# Patient Record
Sex: FEMALE | Race: BLACK OR AFRICAN AMERICAN | NOT HISPANIC OR LATINO | Employment: FULL TIME | URBAN - METROPOLITAN AREA
[De-identification: names, ages, dates, MRNs, and addresses within clinical notes are randomized per-mention and may not be internally consistent; named-entity substitution may affect disease eponyms.]

---

## 2023-05-31 ENCOUNTER — PATIENT OUTREACH (OUTPATIENT)
Dept: CARE COORDINATION | Facility: CLINIC | Age: 43
End: 2023-05-31

## 2023-06-01 ENCOUNTER — PATIENT OUTREACH (OUTPATIENT)
Dept: CARE COORDINATION | Facility: CLINIC | Age: 43
End: 2023-06-01

## 2023-06-01 SDOH — ECONOMIC STABILITY: GENERAL: WOULD YOU LIKE HELP WITH ANY OF THE FOLLOWING NEEDS?: I DONT NEED HELP WITH ANY OF THESE

## 2023-06-01 NOTE — PROGRESS NOTES
Outreach call to patient to support a smooth transition of care from recent admission.  Spoke with patient, reviewed discharge medications, discharge instructions, assessed social needs, and provided education on importance of follow-up appointment with provider. Enrolled patient in [employer] chatbot for additional support and education through transition period.  Will continue to monitor through transition period.  Engagement  Call Start Time: 0924 (6/1/2023  9:24 AM)    Medications  Medications reviewed with patient/caregiver?: Yes (6/1/2023  9:24 AM)  Is the patient having any side effects they believe may be caused by any medication additions or changes?: No (6/1/2023  9:24 AM)  Does the patient have all medications ordered at discharge?: Yes (6/1/2023  9:24 AM)  Care Management Interventions: No intervention needed (6/1/2023  9:24 AM)  Is the patient taking all medications as directed (includes completed medication regime)?: Yes (6/1/2023  9:24 AM)  Care Management Interventions: Provided patient education (6/1/2023  9:24 AM)    Appointments  Does the patient have a primary care provider?: Yes (6/1/2023  9:24 AM)  Care Management Interventions: Verified appointment date/time/provider (Patient has f/u appt 6/1/23 with Derm and 6/8/23 with PCP) (6/1/2023  9:24 AM)  Care Management Interventions: Advised patient to keep appointment (6/1/2023  9:24 AM)    Patient Teaching  Does the patient have access to their discharge instructions?: Yes (6/1/2023  9:24 AM)  Care Management Interventions: Reviewed instructions with patient (6/1/2023  9:24 AM)  What is the patient's perception of their health status since discharge?: Improving (6/1/2023  9:24 AM)    Wrap Up  Wrap Up Additional Comments: Post-discharge assessment complete with discharge instruction review (6/1/2023  9:24 AM)  Call End Time: 0927 (6/1/2023  9:24 AM)      NCM and patient discussed recent hospitalization.  Patient admitted with uncontrolled diabetes  with hyperglycemia.  Patient was discharged home with new insulin orders.  Patient new to insulin and was on oral diabetic medications at home.  [facility] provided education on diabetes action plan, how to manage hypoglycemia, and monitoring blood glucose levels.  Patient agreed to [facility] follow-up/outreach biweekly for diabetic education.      Lashawn ZAPIEN RN CCM

## 2023-10-03 DIAGNOSIS — Z87.39 PERSONAL HISTORY OF OTHER DISEASES OF THE MUSCULOSKELETAL SYSTEM AND CONNECTIVE TISSUE: ICD-10-CM

## 2023-10-03 RX ORDER — CYCLOBENZAPRINE HCL 10 MG
10 TABLET ORAL NIGHTLY
Qty: 30 TABLET | Refills: 2 | Status: SHIPPED | OUTPATIENT
Start: 2023-10-03

## 2023-10-19 PROBLEM — N93.9 ABNORMAL UTERINE BLEEDING: Status: ACTIVE | Noted: 2023-10-19

## 2023-10-19 PROBLEM — E78.5 HYPERLIPIDEMIA: Status: ACTIVE | Noted: 2023-10-19

## 2023-10-19 PROBLEM — F41.9 ANXIETY: Status: ACTIVE | Noted: 2023-10-19

## 2023-10-19 PROBLEM — H52.209 ASTIGMATISM: Status: ACTIVE | Noted: 2023-10-19

## 2023-10-19 PROBLEM — E66.812 OBESITY, CLASS II, BMI 35-39.9: Status: ACTIVE | Noted: 2023-10-19

## 2023-10-19 PROBLEM — G47.33 OSA (OBSTRUCTIVE SLEEP APNEA): Status: ACTIVE | Noted: 2023-10-19

## 2023-10-19 PROBLEM — I82.431 DEEP VEIN THROMBOSIS (DVT) OF POPLITEAL VEIN OF RIGHT LOWER EXTREMITY (MULTI): Status: ACTIVE | Noted: 2023-10-19

## 2023-10-19 PROBLEM — E11.9 DIABETES MELLITUS (MULTI): Status: ACTIVE | Noted: 2023-10-19

## 2023-10-19 PROBLEM — M17.11 LOCALIZED PRIMARY OSTEOARTHRITIS OF RIGHT LOWER LEG: Status: ACTIVE | Noted: 2023-10-19

## 2023-10-19 PROBLEM — N80.9 ENDOMETRIOSIS: Status: ACTIVE | Noted: 2023-10-19

## 2023-10-19 PROBLEM — R10.2 CHRONIC PELVIC PAIN IN FEMALE: Status: ACTIVE | Noted: 2023-10-19

## 2023-10-19 PROBLEM — M19.90 OSTEOARTHRITIS: Status: ACTIVE | Noted: 2023-10-19

## 2023-10-19 PROBLEM — K76.0 FATTY LIVER: Status: ACTIVE | Noted: 2023-10-19

## 2023-10-19 PROBLEM — R92.8 ABNORMAL FINDING ON MAMMOGRAPHY: Status: ACTIVE | Noted: 2023-10-19

## 2023-10-19 PROBLEM — I50.9 CHF (CONGESTIVE HEART FAILURE) (MULTI): Status: ACTIVE | Noted: 2023-10-19

## 2023-10-19 PROBLEM — G89.29 CHRONIC PELVIC PAIN IN FEMALE: Status: ACTIVE | Noted: 2023-10-19

## 2023-10-19 PROBLEM — N63.0 BREAST LUMP: Status: ACTIVE | Noted: 2023-10-19

## 2023-10-19 PROBLEM — G47.00 INSOMNIA: Status: ACTIVE | Noted: 2023-10-19

## 2023-10-19 PROBLEM — I10 HYPERTENSION: Status: ACTIVE | Noted: 2023-10-19

## 2023-10-19 PROBLEM — M79.645 PAIN OF LEFT THUMB: Status: ACTIVE | Noted: 2023-10-19

## 2023-10-19 PROBLEM — N19 RENAL FAILURE: Status: ACTIVE | Noted: 2023-10-19

## 2023-10-19 PROBLEM — R06.02 SOBOE (SHORTNESS OF BREATH ON EXERTION): Status: ACTIVE | Noted: 2023-10-19

## 2023-10-19 PROBLEM — E55.9 VITAMIN D DEFICIENCY: Status: ACTIVE | Noted: 2023-10-19

## 2023-10-19 PROBLEM — M06.4 INFLAMMATORY POLYARTHROPATHY (MULTI): Status: ACTIVE | Noted: 2023-10-19

## 2023-10-19 PROBLEM — E66.9 OBESITY, CLASS II, BMI 35-39.9: Status: ACTIVE | Noted: 2023-10-19

## 2023-10-19 PROBLEM — M65.312 TRIGGER THUMB OF BOTH THUMBS: Status: ACTIVE | Noted: 2023-10-19

## 2023-10-19 PROBLEM — M65.311 TRIGGER THUMB OF BOTH THUMBS: Status: ACTIVE | Noted: 2023-10-19

## 2023-10-19 PROBLEM — N63.10 BREAST MASS, RIGHT: Status: ACTIVE | Noted: 2023-10-19

## 2023-10-19 PROBLEM — R79.89 ELEVATED SERUM CREATININE: Status: ACTIVE | Noted: 2023-10-19

## 2023-10-19 PROBLEM — R20.2 PARESTHESIA OF BOTH HANDS: Status: ACTIVE | Noted: 2023-10-19

## 2023-10-19 PROBLEM — F41.8 DEPRESSION WITH ANXIETY: Status: ACTIVE | Noted: 2023-10-19

## 2023-10-19 PROBLEM — L73.2 HIDRADENITIS: Status: ACTIVE | Noted: 2023-10-19

## 2023-10-19 PROBLEM — N18.30 STAGE III CHRONIC KIDNEY DISEASE (MULTI): Status: ACTIVE | Noted: 2023-10-19

## 2023-10-19 PROBLEM — R73.09 ALTERED GLUCOSE METABOLISM: Status: ACTIVE | Noted: 2023-10-19

## 2023-10-19 RX ORDER — DULAGLUTIDE 1.5 MG/.5ML
INJECTION, SOLUTION SUBCUTANEOUS
COMMUNITY
Start: 2023-09-25 | End: 2023-11-06 | Stop reason: SDUPTHER

## 2023-10-19 RX ORDER — CHOLECALCIFEROL (VITAMIN D3) 125 MCG
5000 CAPSULE ORAL DAILY
COMMUNITY

## 2023-10-19 RX ORDER — VALSARTAN 320 MG/1
1 TABLET ORAL DAILY
COMMUNITY
Start: 2022-01-11 | End: 2024-01-15 | Stop reason: WASHOUT

## 2023-10-19 RX ORDER — BLOOD-GLUCOSE SENSOR
EACH MISCELLANEOUS
COMMUNITY
Start: 2023-09-25 | End: 2023-11-06 | Stop reason: ALTCHOICE

## 2023-10-19 RX ORDER — GABAPENTIN 600 MG/1
600 TABLET ORAL 2 TIMES DAILY
COMMUNITY

## 2023-10-19 RX ORDER — DULAGLUTIDE 0.75 MG/.5ML
INJECTION, SOLUTION SUBCUTANEOUS
COMMUNITY
Start: 2023-09-25 | End: 2023-11-06 | Stop reason: ALTCHOICE

## 2023-10-19 RX ORDER — VALSARTAN 160 MG/1
160 TABLET ORAL DAILY
COMMUNITY
Start: 2023-08-04 | End: 2024-01-15 | Stop reason: WASHOUT

## 2023-10-19 RX ORDER — CHLORTHALIDONE 25 MG/1
25 TABLET ORAL DAILY
COMMUNITY

## 2023-10-19 RX ORDER — ISOPROPYL ALCOHOL 70 ML/100ML
SWAB TOPICAL
COMMUNITY
Start: 2023-07-28

## 2023-10-19 RX ORDER — VERAPAMIL HYDROCHLORIDE 120 MG/1
120 TABLET, FILM COATED, EXTENDED RELEASE ORAL DAILY
COMMUNITY
End: 2024-01-15 | Stop reason: WASHOUT

## 2023-10-19 RX ORDER — GLYBURIDE 5 MG/1
TABLET ORAL
COMMUNITY
End: 2024-01-15 | Stop reason: WASHOUT

## 2023-10-19 RX ORDER — FLUTICASONE PROPIONATE 50 MCG
SPRAY, SUSPENSION (ML) NASAL
COMMUNITY
Start: 2017-09-21 | End: 2024-02-20 | Stop reason: SDUPTHER

## 2023-10-19 RX ORDER — MEDROXYPROGESTERONE ACETATE 150 MG/ML
INJECTION, SUSPENSION INTRAMUSCULAR
COMMUNITY
Start: 2018-08-10 | End: 2024-01-15 | Stop reason: WASHOUT

## 2023-10-19 RX ORDER — CALCITRIOL 0.25 UG/1
0.25 CAPSULE ORAL EVERY OTHER DAY
COMMUNITY
End: 2024-02-20 | Stop reason: SDUPTHER

## 2023-10-19 RX ORDER — VALSARTAN AND HYDROCHLOROTHIAZIDE 160; 25 MG/1; MG/1
1 TABLET ORAL DAILY
COMMUNITY
End: 2024-03-26 | Stop reason: WASHOUT

## 2023-10-19 RX ORDER — GLIPIZIDE 10 MG/1
10 TABLET, FILM COATED, EXTENDED RELEASE ORAL DAILY
COMMUNITY
Start: 2023-04-29 | End: 2024-01-15 | Stop reason: ALTCHOICE

## 2023-10-19 RX ORDER — MEDROXYPROGESTERONE ACETATE 150 MG/ML
INJECTION, SUSPENSION INTRAMUSCULAR
COMMUNITY
Start: 2023-08-24 | End: 2024-05-20 | Stop reason: SDUPTHER

## 2023-10-19 RX ORDER — LANCETS 33 GAUGE
EACH MISCELLANEOUS
COMMUNITY
Start: 2023-07-28

## 2023-10-19 RX ORDER — DAPAGLIFLOZIN 10 MG/1
10 TABLET, FILM COATED ORAL
COMMUNITY
Start: 2023-08-24

## 2023-10-19 RX ORDER — ALBUTEROL SULFATE 90 UG/1
2 AEROSOL, METERED RESPIRATORY (INHALATION) EVERY 4 HOURS PRN
COMMUNITY
Start: 2017-09-12

## 2023-10-19 RX ORDER — FELODIPINE 10 MG/1
1 TABLET, EXTENDED RELEASE ORAL DAILY
COMMUNITY
End: 2024-02-20 | Stop reason: WASHOUT

## 2023-10-19 RX ORDER — CARVEDILOL 25 MG/1
25 TABLET ORAL 2 TIMES DAILY
COMMUNITY
Start: 2023-08-04 | End: 2024-01-31

## 2023-10-19 RX ORDER — ATENOLOL 100 MG/1
1 TABLET ORAL DAILY
COMMUNITY
Start: 2022-01-11 | End: 2024-02-20 | Stop reason: WASHOUT

## 2023-10-19 RX ORDER — PEN NEEDLE, DIABETIC 31 GX5/16"
NEEDLE, DISPOSABLE MISCELLANEOUS
COMMUNITY
Start: 2023-07-28 | End: 2024-01-15 | Stop reason: WASHOUT

## 2023-10-19 RX ORDER — ALLOPURINOL 100 MG/1
100 TABLET ORAL DAILY
COMMUNITY
Start: 2023-08-24 | End: 2024-02-20

## 2023-10-19 RX ORDER — BLOOD SUGAR DIAGNOSTIC
STRIP MISCELLANEOUS
COMMUNITY
Start: 2023-07-28

## 2023-10-19 RX ORDER — AMLODIPINE BESYLATE 10 MG/1
10 TABLET ORAL DAILY
COMMUNITY
Start: 2023-07-28

## 2023-10-19 RX ORDER — SERTRALINE HYDROCHLORIDE 100 MG/1
TABLET, FILM COATED ORAL
COMMUNITY
End: 2024-01-15 | Stop reason: WASHOUT

## 2023-10-19 RX ORDER — FLUCONAZOLE 150 MG/1
TABLET ORAL
COMMUNITY
Start: 2022-08-17 | End: 2024-03-26 | Stop reason: WASHOUT

## 2023-10-19 RX ORDER — FUROSEMIDE 40 MG/1
40 TABLET ORAL DAILY
COMMUNITY
End: 2024-01-15 | Stop reason: ALTCHOICE

## 2023-10-20 ENCOUNTER — APPOINTMENT (OUTPATIENT)
Dept: ENDOCRINOLOGY | Facility: CLINIC | Age: 43
End: 2023-10-20
Payer: COMMERCIAL

## 2023-11-06 ENCOUNTER — TELEMEDICINE CLINICAL SUPPORT (OUTPATIENT)
Dept: ENDOCRINOLOGY | Facility: CLINIC | Age: 43
End: 2023-11-06
Payer: COMMERCIAL

## 2023-11-06 DIAGNOSIS — E11.69 TYPE 2 DIABETES MELLITUS WITH OTHER SPECIFIED COMPLICATION, WITHOUT LONG-TERM CURRENT USE OF INSULIN (MULTI): Primary | ICD-10-CM

## 2023-11-06 PROCEDURE — 99211 OFF/OP EST MAY X REQ PHY/QHP: CPT | Performed by: PHARMACIST

## 2023-11-06 PROCEDURE — 95251 CONT GLUC MNTR ANALYSIS I&R: CPT | Performed by: PHARMACIST

## 2023-11-06 RX ORDER — DULAGLUTIDE 1.5 MG/.5ML
INJECTION, SOLUTION SUBCUTANEOUS
Qty: 6 ML | Refills: 3 | Status: SHIPPED | OUTPATIENT
Start: 2023-11-06 | End: 2024-02-20 | Stop reason: WASHOUT

## 2023-11-06 RX ORDER — BLOOD-GLUCOSE SENSOR
EACH MISCELLANEOUS
Qty: 6 EACH | Refills: 3 | Status: SHIPPED | OUTPATIENT
Start: 2023-11-06

## 2023-11-06 NOTE — PROGRESS NOTES
Clinical Pharmacy Team met with Trini Sanchez regarding a consultation for diabetes management thanks to a referral from Vannessa Rodriguez DNP. Below is a summary of our conversation and recommendations:    Recommendations:  Continue all DM medications as prescribed  2. Patient should continue to use CGM to monitor glucose  ________________________________________________________________________      Allergies   Allergen Reactions    Aspirin Itching    Nsaids (Non-Steroidal Anti-Inflammatory Drug) Unknown       Diabetes Pharmacotherapy:    Trulicity 1.5 mg subcutaneous once weekly  Farxiga 10 mg once daily     Lab Review  Lab Results   Component Value Date    BILITOT 0.8 2023    CALCIUM 9.2 2023    CO2 23 2023    CL 97 (L) 2023    CREATININE 1.67 (H) 2023    GLUCOSE 232 (H) 2023    ALKPHOS 136 (H) 2023    K 4.1 2023    PROT 6.9 2023     2023    AST 23 2023    ALT 20 2023    BUN 14 2023    ANIONGAP 20 2023    MG 2.12 2023    PHOS 3.0 2023    ALBUMIN 3.0 (L) 2023    GFRF 38 (A) 2023    GFRMALE CANCELED 2023     Lab Results   Component Value Date    TRIG 115 2022    CHOL 177 2022    HDL 39.5 (A) 2022     Lab Results   Component Value Date    HGBA1C 19.5 (A) 2023    HGBA1C 8.5 (A) 2022    HGBA1C 7.7 (A) 2022     Monitoring             Assessment/Plan     The patient reports today for a diabetes consultation. Reviewed CGM data and discussed it with the patient. TIR is at goal on current regimen. Patient reports tolerating regimen well and not experiencing any N/V/D secondary to trulicity. At this time recommend no changes in DM regimen. Patient understands and was agreeable to this.     A minimum of 72 hours of CGM data was reviewed and used to make therapy decisions.     PATIENT EDUCATION/GOALS  Goals  Fasting B - 130 mg/dL  Postprandial BG: less than 180  mg/dL  A1c: less than 7%    Type of encounter: [ virtual ]    Provided counseling on lifestyle modifications, medications, and self-monitoring. Patient has no additional questions at this time. Pharmacy will not follow up at this time as she is meeting her glycemic goals. Next Endo appointment in 2 months.. Please reach out with any questions. Thank you.       Shannen Valdivia, PharmD    Provider on site: Esthela Whitney CNP  Continue all meds under the continuation of care with the referring provider and clinical pharmacy team.

## 2024-01-15 ENCOUNTER — TELEPHONE (OUTPATIENT)
Dept: ENDOCRINOLOGY | Facility: CLINIC | Age: 44
End: 2024-01-15

## 2024-01-15 ENCOUNTER — TELEMEDICINE (OUTPATIENT)
Dept: ENDOCRINOLOGY | Facility: CLINIC | Age: 44
End: 2024-01-15
Payer: COMMERCIAL

## 2024-01-15 DIAGNOSIS — E78.5 HYPERLIPIDEMIA, UNSPECIFIED HYPERLIPIDEMIA TYPE: ICD-10-CM

## 2024-01-15 DIAGNOSIS — E11.69 TYPE 2 DIABETES MELLITUS WITH OTHER SPECIFIED COMPLICATION, WITHOUT LONG-TERM CURRENT USE OF INSULIN (MULTI): Primary | ICD-10-CM

## 2024-01-15 DIAGNOSIS — N18.32 STAGE 3B CHRONIC KIDNEY DISEASE (MULTI): ICD-10-CM

## 2024-01-15 DIAGNOSIS — E66.9 OBESITY, CLASS II, BMI 35-39.9: ICD-10-CM

## 2024-01-15 DIAGNOSIS — I10 PRIMARY HYPERTENSION: ICD-10-CM

## 2024-01-15 PROCEDURE — 99214 OFFICE O/P EST MOD 30 MIN: CPT | Performed by: NURSE PRACTITIONER

## 2024-01-15 PROCEDURE — 95251 CONT GLUC MNTR ANALYSIS I&R: CPT | Performed by: NURSE PRACTITIONER

## 2024-01-15 RX ORDER — ACETAMINOPHEN 500 MG
TABLET ORAL
Qty: 1 EACH | Refills: 0 | Status: SHIPPED | OUTPATIENT
Start: 2024-01-15 | End: 2024-03-26 | Stop reason: WASHOUT

## 2024-01-15 RX ORDER — ATORVASTATIN CALCIUM 10 MG/1
10 TABLET, FILM COATED ORAL DAILY
Qty: 30 TABLET | Refills: 11 | Status: SHIPPED | OUTPATIENT
Start: 2024-01-15 | End: 2025-01-14

## 2024-01-15 ASSESSMENT — ENCOUNTER SYMPTOMS
DIZZINESS: 0
DIARRHEA: 0
SEIZURES: 0
ACTIVITY CHANGE: 0
CONSTIPATION: 0
POLYDIPSIA: 0
WEAKNESS: 0
NUMBNESS: 0
PALPITATIONS: 0
NAUSEA: 0
NERVOUS/ANXIOUS: 0
FREQUENCY: 0
SHORTNESS OF BREATH: 0
SLEEP DISTURBANCE: 0
FATIGUE: 0
APPETITE CHANGE: 0
POLYPHAGIA: 0

## 2024-01-15 NOTE — PATIENT INSTRUCTIONS
Type 2 diabetes mellitus, is not at goal, but remarkably improved since her diagnosis with GMI at today's visit at 7%.  Downloaded and interrogated Jessica continuous glucose sensor. Patient average glucose 156 mg/dl with time in range of  mg/dl 78%, 181-250 mg/dl 21%, greater than 250 mg/dl 1% with CV 24%.     RX changes:   INCREASE TRULICITY to 3 mg weekly   CONTINUE FARXIGA 10 mg daily  Education:  interpretation of lab results, blood sugar goals, complications of diabetes mellitus, and nutrition  Hypertension: Managed by primary care and nephrology No changes.   Hyperlipidemia: Initiated atorvastatin 10 mg daily.   BMI 39: Instructed to increase whole fruits and vegetables and reduce packaged and processed foods.   CKD stage 3B. Discussed urgency in blood pressure and glucose control.  Follow up: I recommend diabetes care be 6 months.

## 2024-01-15 NOTE — TELEPHONE ENCOUNTER
When calling patient to schedule a follow up appointment, she wanted me to remind to renew the labs order.  She wants to have them done before next appointment.  Any questions, please all patient.  Thank you

## 2024-01-15 NOTE — PROGRESS NOTES
Subjective   Trini Sanchez is a 43 y.o. female who presents for initial visit for evaluation of Type 2 diabetes mellitus. The initial diagnosis of diabetes was made  May 2023 .     Known complications due to diabetes included none    Cardiovascular risk factors include diabetes mellitus, hypertension, obesity (BMI >= 30 kg/m2), and smoking/ tobacco exposure. The patient is on an ACE inhibitor or angiotensin II receptor blocker.      Current diabetes regimen is as follows:   Farxiga 10 mg daily  Trulicity 1.5 mg weekly    The patient is currently checking the blood glucose 4-6 times per day.  Patient is using: continuous glucose monitor: Jessica 3        Hypoglycemia frequency: none  Hypoglycemia awareness: Yes     Exercise: intermittently   Meal panning: She is using avoidance of concentrated sweets.    Review of Systems   Constitutional:  Negative for activity change, appetite change and fatigue.   Respiratory:  Negative for shortness of breath.    Cardiovascular:  Negative for chest pain, palpitations and leg swelling.   Gastrointestinal:  Negative for constipation, diarrhea and nausea.   Endocrine: Negative for cold intolerance, heat intolerance, polydipsia, polyphagia and polyuria.   Genitourinary:  Negative for frequency.   Musculoskeletal:  Negative for gait problem.   Skin:  Negative for rash.   Neurological:  Negative for dizziness, seizures, weakness and numbness.   Psychiatric/Behavioral:  Negative for sleep disturbance and suicidal ideas. The patient is not nervous/anxious.        Objective   There were no vitals taken for this visit.  Physical Exam  Constitutional:       Appearance: She is obese.   Pulmonary:      Effort: Pulmonary effort is normal.   Neurological:      Mental Status: She is alert and oriented to person, place, and time.   Psychiatric:         Mood and Affect: Mood normal.         Behavior: Behavior normal.         Thought Content: Thought content normal.         Judgment: Judgment  normal.       Lab Review  Glucose (mg/dL)   Date Value   05/30/2023 232 (H)   05/29/2023 240 (H)   05/28/2023 266 (H)     Hemoglobin A1C (%)   Date Value   05/26/2023 19.5 (A)   11/23/2022 8.5 (A)   02/16/2022 7.7 (A)     Bicarbonate (mmol/L)   Date Value   05/30/2023 23   05/29/2023 26   05/28/2023 27     Urea Nitrogen (mg/dL)   Date Value   05/30/2023 14   05/29/2023 20   05/28/2023 31 (H)     Creatinine (mg/dL)   Date Value   05/30/2023 1.67 (H)   05/29/2023 1.95 (H)   05/28/2023 2.18 (H)     Health Maintenance:   Foot Exam: Has foot exams at work every 3 months.   Eye Exam: Cannot recall. Instructed to reach out to primary care for referral.   Lipid Panel:  2022. Labs ordered.   GFR 38: May 2023. States she is now taking hypertensive medications and Farxiga as directed.     Assessment/Plan   Diagnoses and all orders for this visit:  Type 2 diabetes mellitus with other specified complication, without long-term current use of insulin (CMS/[facility])  Hyperlipidemia, unspecified hyperlipidemia type  Primary hypertension  Obesity, Class II, BMI 35-39.9  Stage 3b chronic kidney disease (CMS/[facility])    Type 2 diabetes mellitus, is not at goal, but remarkably improved since her diagnosis with GMI at today's visit at 7%.  Downloaded and interrogated Jessica continuous glucose sensor. Patient average glucose 156 mg/dl with time in range of  mg/dl 78%, 181-250 mg/dl 21%, greater than 250 mg/dl 1% with CV 24%.     RX changes:   INCREASE TRULICITY to 3 mg weekly   CONTINUE FARXIGA 10 mg daily  Education:  interpretation of lab results, blood sugar goals, complications of diabetes mellitus, and nutrition  Hypertension: Managed by primary care and nephrology No changes. BP cuff ordered today.   Hyperlipidemia: Initiated atorvastatin 10 mg daily.   BMI 39: Instructed to increase whole fruits and vegetables and reduce packaged and processed foods.   CKD stage 3B. Discussed urgency in blood pressure and glucose  control.  Follow up: I recommend diabetes care be 6 months.

## 2024-01-31 DIAGNOSIS — I50.9 CONGESTIVE HEART FAILURE, UNSPECIFIED HF CHRONICITY, UNSPECIFIED HEART FAILURE TYPE (MULTI): Primary | ICD-10-CM

## 2024-01-31 RX ORDER — CARVEDILOL 25 MG/1
25 TABLET ORAL 2 TIMES DAILY
Qty: 180 TABLET | Refills: 1 | Status: SHIPPED | OUTPATIENT
Start: 2024-01-31

## 2024-01-31 RX ORDER — ALLOPURINOL 100 MG/1
100 TABLET ORAL DAILY
Refills: 0 | OUTPATIENT
Start: 2024-01-31

## 2024-01-31 NOTE — TELEPHONE ENCOUNTER
Patient can only make 330 or later appointments. She is currently scheduled for March unless you want me to double book a sooner Tuesday

## 2024-02-17 DIAGNOSIS — I10 ESSENTIAL HYPERTENSION: Primary | ICD-10-CM

## 2024-02-20 ENCOUNTER — OFFICE VISIT (OUTPATIENT)
Dept: PRIMARY CARE | Facility: CLINIC | Age: 44
End: 2024-02-20
Payer: COMMERCIAL

## 2024-02-20 VITALS
SYSTOLIC BLOOD PRESSURE: 122 MMHG | BODY MASS INDEX: 35.12 KG/M2 | WEIGHT: 218.5 LBS | OXYGEN SATURATION: 98 % | HEART RATE: 110 BPM | DIASTOLIC BLOOD PRESSURE: 79 MMHG | TEMPERATURE: 98.9 F | RESPIRATION RATE: 16 BRPM | HEIGHT: 66 IN

## 2024-02-20 DIAGNOSIS — I50.32 CHRONIC DIASTOLIC CONGESTIVE HEART FAILURE (MULTI): Primary | ICD-10-CM

## 2024-02-20 DIAGNOSIS — M17.0 OSTEOARTHRITIS OF BOTH KNEES, UNSPECIFIED OSTEOARTHRITIS TYPE: ICD-10-CM

## 2024-02-20 DIAGNOSIS — E55.9 VITAMIN D DEFICIENCY: ICD-10-CM

## 2024-02-20 DIAGNOSIS — F41.9 ANXIETY: ICD-10-CM

## 2024-02-20 DIAGNOSIS — T78.40XD ALLERGY, SUBSEQUENT ENCOUNTER: ICD-10-CM

## 2024-02-20 DIAGNOSIS — Z23 NEED FOR INFLUENZA VACCINATION: ICD-10-CM

## 2024-02-20 PROCEDURE — 90686 IIV4 VACC NO PRSV 0.5 ML IM: CPT | Performed by: INTERNAL MEDICINE

## 2024-02-20 PROCEDURE — 3074F SYST BP LT 130 MM HG: CPT | Performed by: INTERNAL MEDICINE

## 2024-02-20 PROCEDURE — 3008F BODY MASS INDEX DOCD: CPT | Performed by: INTERNAL MEDICINE

## 2024-02-20 PROCEDURE — 3078F DIAST BP <80 MM HG: CPT | Performed by: INTERNAL MEDICINE

## 2024-02-20 PROCEDURE — 99214 OFFICE O/P EST MOD 30 MIN: CPT | Mod: GC | Performed by: INTERNAL MEDICINE

## 2024-02-20 PROCEDURE — 99214 OFFICE O/P EST MOD 30 MIN: CPT | Performed by: INTERNAL MEDICINE

## 2024-02-20 RX ORDER — DULOXETIN HYDROCHLORIDE 20 MG/1
20 CAPSULE, DELAYED RELEASE ORAL DAILY
Qty: 30 CAPSULE | Refills: 11 | Status: SHIPPED | OUTPATIENT
Start: 2024-02-20 | End: 2025-02-19

## 2024-02-20 RX ORDER — CALCITRIOL 0.25 UG/1
0.25 CAPSULE ORAL EVERY OTHER DAY
Qty: 15 CAPSULE | Refills: 0 | Status: SHIPPED | OUTPATIENT
Start: 2024-02-20 | End: 2024-03-21 | Stop reason: SDUPTHER

## 2024-02-20 RX ORDER — ALLOPURINOL 100 MG/1
100 TABLET ORAL DAILY
Qty: 90 TABLET | Refills: 3 | Status: SHIPPED | OUTPATIENT
Start: 2024-02-20

## 2024-02-20 RX ORDER — FLUTICASONE PROPIONATE 50 MCG
1 SPRAY, SUSPENSION (ML) NASAL DAILY
Qty: 16 G | Refills: 3 | Status: SHIPPED | OUTPATIENT
Start: 2024-02-20 | End: 2024-04-20

## 2024-02-20 SDOH — ECONOMIC STABILITY: FOOD INSECURITY: WITHIN THE PAST 12 MONTHS, THE FOOD YOU BOUGHT JUST DIDN'T LAST AND YOU DIDN'T HAVE MONEY TO GET MORE.: NEVER TRUE

## 2024-02-20 SDOH — ECONOMIC STABILITY: FOOD INSECURITY: WITHIN THE PAST 12 MONTHS, YOU WORRIED THAT YOUR FOOD WOULD RUN OUT BEFORE YOU GOT MONEY TO BUY MORE.: NEVER TRUE

## 2024-02-20 ASSESSMENT — LIFESTYLE VARIABLES
HOW OFTEN DO YOU HAVE A DRINK CONTAINING ALCOHOL: NEVER
SKIP TO QUESTIONS 9-10: 1
HOW MANY STANDARD DRINKS CONTAINING ALCOHOL DO YOU HAVE ON A TYPICAL DAY: PATIENT DOES NOT DRINK
HOW OFTEN DO YOU HAVE SIX OR MORE DRINKS ON ONE OCCASION: NEVER
AUDIT-C TOTAL SCORE: 0

## 2024-02-20 ASSESSMENT — ENCOUNTER SYMPTOMS
DEPRESSION: 0
LOSS OF SENSATION IN FEET: 0
VOMITING: 1
OCCASIONAL FEELINGS OF UNSTEADINESS: 0
NAUSEA: 1

## 2024-02-20 ASSESSMENT — PAIN SCALES - GENERAL: PAINLEVEL: 0-NO PAIN

## 2024-02-20 NOTE — PROGRESS NOTES
"Subjective   Patient ID: Trini Sanchez is a 43 y.o. female who presents for Vomiting.    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 Feels better once she throws up. Emesis is yellow. Last happened today. Job has gotten much more stressful recently. Family has been sick recently, congested, coughing, not sure about fevers or chills. Does not know if anyone has gotten COVID or flu tested. Cough and runny nose since this morning. Endorses illicit pain pill use for pain in her knees and back. Thinks it is percocet.          Review of Systems   Gastrointestinal:  Positive for nausea and vomiting.   All other systems reviewed and are negative.      Objective   /79 (BP Location: Left arm, Patient Position: Sitting, BP Cuff Size: Adult)   Pulse 110   Temp 37.2 °C (98.9 °F) (Temporal)   Resp 16   Ht 1.676 m (5' 6\")   Wt 99.1 kg (218 lb 8 oz)   SpO2 98%   BMI 35.27 kg/m²     Physical Exam  Constitutional:       General: She is not in acute distress.  HENT:      Head: Normocephalic and atraumatic.      Comments: Soft mass over right eye, mobile, nontender     Nose: No congestion or rhinorrhea.      Mouth/Throat:      Mouth: Mucous membranes are moist.      Pharynx: Oropharynx is clear. No posterior oropharyngeal erythema.   Eyes:      Extraocular Movements: Extraocular movements intact.      Conjunctiva/sclera: Conjunctivae normal.      Pupils: Pupils are equal, round, and reactive to light.   Cardiovascular:      Rate and Rhythm: Normal rate and regular rhythm.      Heart sounds: No murmur heard.     No friction rub. No gallop.   Pulmonary:      Effort: Pulmonary effort is normal. No respiratory distress.      Breath sounds: Normal breath sounds. No wheezing, rhonchi or rales.   Chest:      Chest wall: No tenderness.   Abdominal:      General: Abdomen is flat. Bowel sounds are normal. There is no distension.      Palpations: Abdomen is soft. "      Tenderness: There is no abdominal tenderness. There is no rebound.   Skin:     General: Skin is warm and dry.   Neurological:      General: No focal deficit present.      Mental Status: She is alert and oriented to person, place, and time. Mental status is at baseline.         Assessment/Plan        1. HTN: cont valsartan, chlorthalidone, BP at goal  2. DM: cont Farxiga, trulicity per endocrine, off insulin due to hypoglycemia  3. CHF: f/u with Cards  4. CKD III/IV: appt with nephrology in March  5. Mamm due Oct.   6. Anxiety--discussed starting duloxetine for anxiety and pain   7. Foot numbness--refer for diabetic foot exam, reporting numbness in her toes   8. Lipoma and hair loss - referral to derm

## 2024-03-21 DIAGNOSIS — E55.9 VITAMIN D DEFICIENCY: ICD-10-CM

## 2024-03-21 RX ORDER — CALCITRIOL 0.25 UG/1
0.25 CAPSULE ORAL EVERY OTHER DAY
Qty: 15 CAPSULE | Refills: 13 | Status: SHIPPED | OUTPATIENT
Start: 2024-03-21 | End: 2025-04-20

## 2024-03-26 ENCOUNTER — OFFICE VISIT (OUTPATIENT)
Dept: NEPHROLOGY | Facility: CLINIC | Age: 44
End: 2024-03-26
Payer: COMMERCIAL

## 2024-03-26 VITALS
WEIGHT: 221 LBS | DIASTOLIC BLOOD PRESSURE: 86 MMHG | SYSTOLIC BLOOD PRESSURE: 137 MMHG | HEART RATE: 116 BPM | HEIGHT: 66 IN | BODY MASS INDEX: 35.52 KG/M2

## 2024-03-26 DIAGNOSIS — E21.3 HYPERPARATHYROIDISM (MULTI): ICD-10-CM

## 2024-03-26 DIAGNOSIS — N18.31 STAGE 3A CHRONIC KIDNEY DISEASE (MULTI): Primary | ICD-10-CM

## 2024-03-26 DIAGNOSIS — I10 ESSENTIAL HYPERTENSION: ICD-10-CM

## 2024-03-26 PROCEDURE — 4010F ACE/ARB THERAPY RXD/TAKEN: CPT | Performed by: INTERNAL MEDICINE

## 2024-03-26 PROCEDURE — 3079F DIAST BP 80-89 MM HG: CPT | Performed by: INTERNAL MEDICINE

## 2024-03-26 PROCEDURE — 99214 OFFICE O/P EST MOD 30 MIN: CPT | Performed by: INTERNAL MEDICINE

## 2024-03-26 PROCEDURE — 3008F BODY MASS INDEX DOCD: CPT | Performed by: INTERNAL MEDICINE

## 2024-03-26 PROCEDURE — 3075F SYST BP GE 130 - 139MM HG: CPT | Performed by: INTERNAL MEDICINE

## 2024-03-26 RX ORDER — VALSARTAN 320 MG/1
320 TABLET ORAL DAILY
Qty: 90 TABLET | Refills: 3 | Status: SHIPPED | OUTPATIENT
Start: 2024-03-26 | End: 2024-04-22 | Stop reason: SDUPTHER

## 2024-03-26 NOTE — PROGRESS NOTES
Trini Laura   43 y.o.    @@  [facility]/Room: [id number]/Room/bed info not found    Subjective:   The patient is being seen for a routine clinic follow-up of chronic kidney disease. Recently, the disease has been stable. Disease complications:  No hyperkalemia, no hypocalcemia, no hyperphosphatemia, no metabolic acidosis, no coagulopathy, no uremic encephalopathy, no neuropathy and no renal osteodystrophy. The patient is currently asymptomatic. No associated symptoms are reported.       Meds:   Current Outpatient Medications   Medication Sig Dispense Refill    albuterol 90 mcg/actuation inhaler Inhale 2 puffs every 4 hours if needed for wheezing.      alcohol swabs pads, medicated USE TO CLEAN SKIN PRIOR TO INSULIN INJECTION OR BLOOD GLUCOSE CHECK 4 TIMES DAILY      allopurinol (Zyloprim) 100 mg tablet TAKE 1 TABLET BY MOUTH EVERY DAY 90 tablet 3    amLODIPine (Norvasc) 10 mg tablet Take 1 tablet (10 mg) by mouth once daily.      atorvastatin (Lipitor) 10 mg tablet Take 1 tablet (10 mg) by mouth once daily. 30 tablet 11    calcitriol (Rocaltrol) 0.25 mcg capsule Take 1 capsule (0.25 mcg) by mouth every other day. 15 capsule 13    carvedilol (Coreg) 25 mg tablet TAKE 1 TABLET TWICE A  tablet 1    chlorthalidone (Hygroton) 25 mg tablet Take 1 tablet (25 mg) by mouth once daily.      cholecalciferol (Vitamin D-3) 125 MCG (5000 UT) capsule Take 1 capsule (125 mcg) by mouth once daily.      cyclobenzaprine (Flexeril) 10 mg tablet TAKE 1 TABLET BY MOUTH EVERYDAY AT BEDTIME 30 tablet 2    dulaglutide 3 mg/0.5 mL pen injector Inject 3 mg under the skin 1 (one) time per week. 2 mL 11    DULoxetine (Cymbalta) 20 mg DR capsule Take 1 capsule (20 mg) by mouth once daily. Do not crush or chew. 30 capsule 11    Farxiga 10 mg Take 1 tablet (10 mg) by mouth once daily in the morning. Take before meals.      fluticasone (Flonase) 50 mcg/actuation nasal spray Administer 1 spray into each nostril once daily. 16 g 3    FreeStyle  Jessica 3 Sensor device CHANGE SENSOR EVERY 14 DAYS AS DIRECTED. 6 each 3    gabapentin (Neurontin) 600 mg tablet Take 1 tablet (600 mg) by mouth 2 times a day.      glucagon 3 mg/actuation spray,non-aerosol USE 1 SPRAY INTRANASALLY AS NEEDED FOR BLOOD SUGAR <70 1 each 3    medroxyPROGESTERone 150 mg/mL injection Inject into the muscle every 3 months.      OneTouch Delica Plus Lancet 33 gauge misc USE TO CHECK GLUCOSE 4 TIMES A DAY      OneTouch Ultra Test strip CHECK BLOOD SUGARS 4 TIMES DAILY*NOT COVERED*      valsartan-hydrochlorothiazide (Diovan-HCT) 160-25 mg tablet Take 1 tablet by mouth once daily.       No current facility-administered medications for this visit.          ROS:  The patient is awake and oriented. No dizziness or lightheadedness. No chills and no fever. No headaches. No nausea and no vomiting. No shortness of breath. No cough. No sputum. No chest pain. No chest tightness. No abdominal pain. No diarrhea and no constipation. No hematemesis or hemoptysis. No hematuria. No rectal bleeding. No melena. No epistaxis. No urinary symptoms. No flank pain. No leg edema. No leg pain. No weakness. No itching. Overall, the rest of the review of systems is also negative.  12 point review of systems otherwise negative as stated in HPI.        Physical Examination:        Vitals:    03/26/24 1539   BP: 143/88   Pulse: 99     General: The patient is awake, oriented, and is not in any distress.  Head and Neck: Normocephalic. No periorbital edema.  Eyes: non-icteric  Respiratory: Symmetric air entry. Symmetric chest expansion.No respiratory distress.  Cardiovascular: Symmetric peripheral pulses.  Skin: No maculopapular rash.  Abdomen: soft, nt/nd  Musculoskeletal: No peripheral edema in both left and right upper extremities.  No edema in either left or right lower extremities.  Neuro Exam: Speech is fluent. Moves extremities.    Imaging:  === 02/17/23 ===    US RENAL COMPLETE    - Impression -  Unremarkable renal  ultrasound within the limitations described above.    I personally reviewed the images/study and I agree with the findings  as stated. This study was interpreted at [facility], [place], [place].       Blood Labs:  No results found for this or any previous visit (from the past 24 hour(s)).   Lab Results   Component Value Date    PTH 98.1 (H) 02/17/2023    PROTUR NEGATIVE 05/26/2023    PROTUR CANCELED 05/26/2023    PROTUR 4 (L) 02/17/2023    PHOS 3.0 05/30/2023      Lab Results   Component Value Date    GLUCOSE 232 (H) 05/30/2023    CALCIUM 9.2 05/30/2023     05/30/2023    K 4.1 05/30/2023    CO2 23 05/30/2023    CL 97 (L) 05/30/2023    BUN 14 05/30/2023    CREATININE 1.67 (H) 05/30/2023         Assessment and Plan:    1. Hypertension. Blood pressure is slightly higher than the goal.  I noticed that she takes both chlorthalidone and hydrochlorothiazide.  I stopped her hydrochlorothiazide and instead I increased her valsartan dose.    2. Chronic kidney disease is stage III. Her last Cr level is 1.56. Normal K and Bicarb level     3. Congestive heart failure.     4. Diabetes. I asked for spot urine protein to creatinine ratio.     #5 proteinuria. Her last spot urine protein to creatinine ratio showed about half a gram proteinuria. She is on valsartan and Farxiga.    I will see her in about 3 months for follow-up.         Lucho Abdul MD  Senior Attending Physician  Director of Onco-Nephrology Program  Division of Nephrology & Hypertension  [facility]

## 2024-04-08 ENCOUNTER — LAB (OUTPATIENT)
Dept: LAB | Facility: LAB | Age: 44
End: 2024-04-08
Payer: COMMERCIAL

## 2024-04-08 DIAGNOSIS — E11.69 TYPE 2 DIABETES MELLITUS WITH OTHER SPECIFIED COMPLICATION, WITHOUT LONG-TERM CURRENT USE OF INSULIN (MULTI): ICD-10-CM

## 2024-04-08 DIAGNOSIS — E21.3 HYPERPARATHYROIDISM (MULTI): ICD-10-CM

## 2024-04-08 DIAGNOSIS — N18.31 STAGE 3A CHRONIC KIDNEY DISEASE (MULTI): ICD-10-CM

## 2024-04-08 DIAGNOSIS — I10 ESSENTIAL HYPERTENSION: ICD-10-CM

## 2024-04-08 LAB
ALBUMIN SERPL BCP-MCNC: 3.5 G/DL (ref 3.4–5)
ALP SERPL-CCNC: 106 U/L (ref 33–110)
ALT SERPL W P-5'-P-CCNC: 12 U/L (ref 7–45)
ANION GAP SERPL CALC-SCNC: 12 MMOL/L (ref 10–20)
AST SERPL W P-5'-P-CCNC: 16 U/L (ref 9–39)
BILIRUB SERPL-MCNC: 0.3 MG/DL (ref 0–1.2)
BUN SERPL-MCNC: 37 MG/DL (ref 6–23)
CALCIUM SERPL-MCNC: 9.2 MG/DL (ref 8.6–10.6)
CHLORIDE SERPL-SCNC: 110 MMOL/L (ref 98–107)
CHOLEST SERPL-MCNC: 125 MG/DL (ref 0–199)
CHOLESTEROL/HDL RATIO: 3.9
CO2 SERPL-SCNC: 25 MMOL/L (ref 21–32)
CREAT SERPL-MCNC: 2.86 MG/DL (ref 0.5–1.05)
CREAT UR-MCNC: 267.8 MG/DL (ref 20–320)
CREAT UR-MCNC: 267.8 MG/DL (ref 20–320)
EGFRCR SERPLBLD CKD-EPI 2021: 20 ML/MIN/1.73M*2
EST. AVERAGE GLUCOSE BLD GHB EST-MCNC: 157 MG/DL
GLUCOSE SERPL-MCNC: 166 MG/DL (ref 74–99)
HBA1C MFR BLD: 7.1 %
HDLC SERPL-MCNC: 31.9 MG/DL
LDLC SERPL CALC-MCNC: 64 MG/DL
MICROALBUMIN UR-MCNC: 40.9 MG/L
MICROALBUMIN/CREAT UR: 15.3 UG/MG CREAT
NON HDL CHOLESTEROL: 93 MG/DL (ref 0–149)
POTASSIUM SERPL-SCNC: 4.4 MMOL/L (ref 3.5–5.3)
PROT SERPL-MCNC: 6.9 G/DL (ref 6.4–8.2)
PROT UR-ACNC: 95 MG/DL (ref 5–24)
PROT/CREAT UR: 0.35 MG/MG CREAT (ref 0–0.17)
PTH-INTACT SERPL-MCNC: 82.5 PG/ML (ref 18.5–88)
SODIUM SERPL-SCNC: 143 MMOL/L (ref 136–145)
TRIGL SERPL-MCNC: 144 MG/DL (ref 0–149)
VLDL: 29 MG/DL (ref 0–40)

## 2024-04-08 PROCEDURE — 80061 LIPID PANEL: CPT

## 2024-04-08 PROCEDURE — 36415 COLL VENOUS BLD VENIPUNCTURE: CPT

## 2024-04-08 PROCEDURE — 82570 ASSAY OF URINE CREATININE: CPT

## 2024-04-08 PROCEDURE — 80053 COMPREHEN METABOLIC PANEL: CPT

## 2024-04-08 PROCEDURE — 83970 ASSAY OF PARATHORMONE: CPT

## 2024-04-08 PROCEDURE — 84156 ASSAY OF PROTEIN URINE: CPT

## 2024-04-08 PROCEDURE — 83036 HEMOGLOBIN GLYCOSYLATED A1C: CPT

## 2024-04-08 PROCEDURE — 82043 UR ALBUMIN QUANTITATIVE: CPT

## 2024-04-09 ENCOUNTER — TELEPHONE (OUTPATIENT)
Dept: ENDOCRINOLOGY | Facility: CLINIC | Age: 44
End: 2024-04-09
Payer: COMMERCIAL

## 2024-04-09 ENCOUNTER — TELEPHONE (OUTPATIENT)
Dept: PRIMARY CARE | Facility: CLINIC | Age: 44
End: 2024-04-09

## 2024-04-09 NOTE — TELEPHONE ENCOUNTER
----- Message from Lucho Abdul MD sent at 4/8/2024  4:10 PM EDT -----  Kidney function is slightly worse than the previous number.

## 2024-04-09 NOTE — TELEPHONE ENCOUNTER
Cell phone does not allow for messages, she does not have MyChart.  Labs are within normal ranges for medical status. We will discuss at next appointment

## 2024-04-21 DIAGNOSIS — I10 ESSENTIAL (PRIMARY) HYPERTENSION: ICD-10-CM

## 2024-04-22 RX ORDER — VALSARTAN 160 MG/1
160 TABLET ORAL DAILY
Qty: 90 TABLET | Refills: 3 | Status: SHIPPED | OUTPATIENT
Start: 2024-04-22 | End: 2024-06-11 | Stop reason: SDUPTHER

## 2024-05-16 DIAGNOSIS — E11.69 TYPE 2 DIABETES MELLITUS WITH OTHER SPECIFIED COMPLICATION, WITHOUT LONG-TERM CURRENT USE OF INSULIN (MULTI): Primary | ICD-10-CM

## 2024-05-17 RX ORDER — INSULIN GLARGINE 100 [IU]/ML
INJECTION, SOLUTION SUBCUTANEOUS
Qty: 30 ML | Refills: 1 | Status: SHIPPED | OUTPATIENT
Start: 2024-05-17

## 2024-05-20 DIAGNOSIS — Z30.019 ENCOUNTER FOR FEMALE BIRTH CONTROL: Primary | ICD-10-CM

## 2024-05-20 RX ORDER — MEDROXYPROGESTERONE ACETATE 150 MG/ML
150 INJECTION, SUSPENSION INTRAMUSCULAR
Qty: 1 ML | Refills: 0 | Status: SHIPPED | OUTPATIENT
Start: 2024-05-20

## 2024-05-20 NOTE — TELEPHONE ENCOUNTER
Refill request received via fax for Depo Provera. Called the patient and let her know she needed an annual for refills. Scheduled on 6/10/24 at MAC 1200. Refill request sent to Dr. Baeza.

## 2024-06-11 DIAGNOSIS — I10 ESSENTIAL (PRIMARY) HYPERTENSION: ICD-10-CM

## 2024-06-11 RX ORDER — VALSARTAN 160 MG/1
160 TABLET ORAL DAILY
Qty: 90 TABLET | Refills: 3 | Status: SHIPPED | OUTPATIENT
Start: 2024-06-11 | End: 2024-06-14 | Stop reason: SDUPTHER

## 2024-06-11 NOTE — TELEPHONE ENCOUNTER
Voicemail from [facility], medication request for Valsartan, please send to [facility], last authorizing provider was Sandeep Nobles MD

## 2024-06-14 ENCOUNTER — TELEPHONE (OUTPATIENT)
Dept: PRIMARY CARE | Facility: CLINIC | Age: 44
End: 2024-06-14
Payer: COMMERCIAL

## 2024-06-14 DIAGNOSIS — R06.02 SOBOE (SHORTNESS OF BREATH ON EXERTION): ICD-10-CM

## 2024-06-14 DIAGNOSIS — E11.69 TYPE 2 DIABETES MELLITUS WITH OTHER SPECIFIED COMPLICATION, UNSPECIFIED WHETHER LONG TERM INSULIN USE (MULTI): Primary | ICD-10-CM

## 2024-06-14 DIAGNOSIS — E55.9 VITAMIN D DEFICIENCY: ICD-10-CM

## 2024-06-14 DIAGNOSIS — I50.9 CONGESTIVE HEART FAILURE, UNSPECIFIED HF CHRONICITY, UNSPECIFIED HEART FAILURE TYPE (MULTI): ICD-10-CM

## 2024-06-14 DIAGNOSIS — I10 ESSENTIAL (PRIMARY) HYPERTENSION: ICD-10-CM

## 2024-06-14 RX ORDER — CARVEDILOL 25 MG/1
25 TABLET ORAL 2 TIMES DAILY
Qty: 180 TABLET | Refills: 3 | Status: SHIPPED | OUTPATIENT
Start: 2024-06-14 | End: 2025-06-14

## 2024-06-14 RX ORDER — ISOPROPYL ALCOHOL 70 ML/100ML
SWAB TOPICAL
Qty: 100 EACH | Refills: 11 | Status: SHIPPED | OUTPATIENT
Start: 2024-06-14

## 2024-06-14 RX ORDER — BLOOD SUGAR DIAGNOSTIC
STRIP MISCELLANEOUS
Qty: 100 STRIP | Refills: 11 | Status: SHIPPED | OUTPATIENT
Start: 2024-06-14

## 2024-06-14 RX ORDER — ALBUTEROL SULFATE 90 UG/1
2 AEROSOL, METERED RESPIRATORY (INHALATION) EVERY 4 HOURS PRN
Qty: 18 G | Refills: 11 | Status: SHIPPED | OUTPATIENT
Start: 2024-06-14

## 2024-06-14 RX ORDER — CALCITRIOL 0.25 UG/1
0.25 CAPSULE ORAL EVERY OTHER DAY
Qty: 45 CAPSULE | Refills: 3 | Status: SHIPPED | OUTPATIENT
Start: 2024-06-14 | End: 2025-06-14

## 2024-06-14 RX ORDER — VALSARTAN 160 MG/1
320 TABLET ORAL DAILY
Qty: 90 TABLET | Refills: 3 | Status: SHIPPED | OUTPATIENT
Start: 2024-06-14

## 2024-06-14 NOTE — TELEPHONE ENCOUNTER
[facility] pharmacy left  requesting clarification on valsartan. She stated pt was previously prescribed valsartan 320mg but the new rx that was sent over was valsartan 160mg. They are wanting to clarify that there was a decrease in dosage or was 160mg sent over  in error. [phone]

## 2024-07-03 ENCOUNTER — TELEPHONE (OUTPATIENT)
Dept: ENDOCRINOLOGY | Facility: CLINIC | Age: 44
End: 2024-07-03
Payer: COMMERCIAL

## 2024-07-03 DIAGNOSIS — E11.69 TYPE 2 DIABETES MELLITUS WITH OTHER SPECIFIED COMPLICATION, WITHOUT LONG-TERM CURRENT USE OF INSULIN (MULTI): Primary | ICD-10-CM

## 2024-07-03 NOTE — TELEPHONE ENCOUNTER
Spoke with patient and discussed that there is a national shortage and sent the 1.5 mg dose so she can fill that as an alternative.

## 2024-07-03 NOTE — TELEPHONE ENCOUNTER
Left a VM that she hasn't had trulicity in 2 weeks and would like to be advised as to best next steps. Her phone does not have a VM so she states try calling twice in a row if she doesn't answer the first time. (She also attempted to leave an alternative number, but the line kept cutting out on the numbers) [phone]

## 2024-07-15 ENCOUNTER — APPOINTMENT (OUTPATIENT)
Dept: ENDOCRINOLOGY | Facility: CLINIC | Age: 44
End: 2024-07-15
Payer: COMMERCIAL

## 2024-07-15 DIAGNOSIS — E78.5 HYPERLIPIDEMIA, UNSPECIFIED HYPERLIPIDEMIA TYPE: ICD-10-CM

## 2024-07-15 DIAGNOSIS — I10 PRIMARY HYPERTENSION: ICD-10-CM

## 2024-07-15 DIAGNOSIS — N18.32 STAGE 3B CHRONIC KIDNEY DISEASE (MULTI): ICD-10-CM

## 2024-07-15 DIAGNOSIS — E11.69 TYPE 2 DIABETES MELLITUS WITH OTHER SPECIFIED COMPLICATION, WITHOUT LONG-TERM CURRENT USE OF INSULIN (MULTI): Primary | ICD-10-CM

## 2024-07-15 DIAGNOSIS — E66.9 OBESITY, CLASS II, BMI 35-39.9: ICD-10-CM

## 2024-07-15 PROCEDURE — 99214 OFFICE O/P EST MOD 30 MIN: CPT | Performed by: NURSE PRACTITIONER

## 2024-07-15 PROCEDURE — 3048F LDL-C <100 MG/DL: CPT | Performed by: NURSE PRACTITIONER

## 2024-07-15 PROCEDURE — 4010F ACE/ARB THERAPY RXD/TAKEN: CPT | Performed by: NURSE PRACTITIONER

## 2024-07-15 PROCEDURE — 95251 CONT GLUC MNTR ANALYSIS I&R: CPT | Performed by: NURSE PRACTITIONER

## 2024-07-15 PROCEDURE — 3051F HG A1C>EQUAL 7.0%<8.0%: CPT | Performed by: NURSE PRACTITIONER

## 2024-07-15 PROCEDURE — 3060F POS MICROALBUMINURIA REV: CPT | Performed by: NURSE PRACTITIONER

## 2024-07-15 ASSESSMENT — ENCOUNTER SYMPTOMS
SLEEP DISTURBANCE: 0
NERVOUS/ANXIOUS: 0
ACTIVITY CHANGE: 0
DIZZINESS: 0
POLYDIPSIA: 0
SEIZURES: 0
POLYPHAGIA: 0
NUMBNESS: 0
PALPITATIONS: 0
SHORTNESS OF BREATH: 0
WEAKNESS: 0
NAUSEA: 0
FATIGUE: 0
APPETITE CHANGE: 0
FREQUENCY: 0
CONSTIPATION: 0
DIARRHEA: 0

## 2024-07-15 NOTE — PROGRESS NOTES
Subjective   Trini Sanchez is a 44 y.o. female who presents for initial visit for evaluation of Type 2 diabetes mellitus. The initial diagnosis of diabetes was made  May 2023 .     Known complications due to diabetes included none    Cardiovascular risk factors include diabetes mellitus, hypertension, obesity (BMI >= 30 kg/m2), and smoking/ tobacco exposure. The patient is on an ACE inhibitor or angiotensin II receptor blocker.      Current diabetes regimen is as follows:   Farxiga 10 mg daily  Trulicity 1.5 mg weekly, she is waiting on 3 mg to be back in stock due to national shortage    The patient is currently checking the blood glucose 4-6 times per day.  Patient is using: continuous glucose monitor: Jessica 3      Hypoglycemia frequency: none  Hypoglycemia awareness: Yes     Exercise: intermittently   Meal panning: She is using avoidance of concentrated sweets.    Review of Systems   Constitutional:  Negative for activity change, appetite change and fatigue.   Respiratory:  Negative for shortness of breath.    Cardiovascular:  Negative for chest pain, palpitations and leg swelling.   Gastrointestinal:  Negative for constipation, diarrhea and nausea.   Endocrine: Negative for cold intolerance, heat intolerance, polydipsia, polyphagia and polyuria.   Genitourinary:  Negative for frequency.   Musculoskeletal:  Negative for gait problem.   Skin:  Negative for rash.   Neurological:  Negative for dizziness, seizures, weakness and numbness.   Psychiatric/Behavioral:  Negative for sleep disturbance and suicidal ideas. The patient is not nervous/anxious.      Objective   There were no vitals taken for this visit.  Physical Exam  Constitutional:       Appearance: She is obese.   Pulmonary:      Effort: Pulmonary effort is normal.   Neurological:      Mental Status: She is alert and oriented to person, place, and time.   Psychiatric:         Mood and Affect: Mood normal.         Behavior: Behavior normal.         Thought  Content: Thought content normal.         Judgment: Judgment normal.       Lab Review  Glucose (mg/dL)   Date Value   04/08/2024 166 (H)   05/30/2023 232 (H)   05/29/2023 240 (H)   05/28/2023 266 (H)     Hemoglobin A1C (%)   Date Value   04/08/2024 7.1 (H)   02/28/2024 9.2 (H)   05/26/2023 19.5 (A)   11/23/2022 8.5 (A)   02/16/2022 7.7 (A)     Bicarbonate (mmol/L)   Date Value   04/08/2024 25   05/30/2023 23   05/29/2023 26   05/28/2023 27     Urea Nitrogen (mg/dL)   Date Value   04/08/2024 37 (H)   05/30/2023 14   05/29/2023 20   05/28/2023 31 (H)     Creatinine (mg/dL)   Date Value   04/08/2024 2.86 (H)   05/30/2023 1.67 (H)   05/29/2023 1.95 (H)   05/28/2023 2.18 (H)     Health Maintenance:   Foot Exam: Has foot exams at work every 3 months.   Eye Exam: Cannot recall. Instructed to reach out to primary care for referral.   Lipid Panel: Total 125 LDL 64 April 2024  GFR 20: April 2024. States she is now taking hypertensive medications and Farxiga as directed and was at the time hospitalized with pneumonia. Updated labs ordered.     Assessment/Plan   Diagnoses and all orders for this visit:  Type 2 diabetes mellitus with other specified complication, without long-term current use of insulin (Multi)  -     Hemoglobin A1c; Future  -     Albumin-Creatinine Ratio, Random Urine; Future  -     Basic metabolic panel; Future  Hyperlipidemia, unspecified hyperlipidemia type  Primary hypertension  Obesity, Class II, BMI 35-39.9  Stage 3b chronic kidney disease (Multi)    Type 2 diabetes mellitus, is not at goal, GMI 6.9%. Downloaded and interrogated Jessica continuous glucose sensor. Patient average glucose 151 mg/dl with time in range of  mg/dl 84%, 181-250 mg/dl 15%, greater than 250 mg/dl 1% with CV 20%. Fasting glucose would improve with increase in Trulicity, which is not available due to national shortage.     RX changes:   INCREASE TRULICITY to 3 mg weekly as soon as available. We can increase again to 4.5 mg weekly  after at a month of the 3 mg.   CONTINUE FARXIGA 10 mg daily  Education:  interpretation of lab results, blood sugar goals, complications of diabetes mellitus, and nutrition  Hypertension: Managed by primary care and nephrology No changes.   Hyperlipidemia: At goal. Taking. Atorvastatin 10 mg daily.   BMI 39: Instructed to increase whole fruits and vegetables and reduce packaged and processed foods.   CKD stage 3B. Discussed urgency in blood pressure and glucose control. Instructed to follow up with nephrology and cardiology.   Follow up: I recommend diabetes care be 6 months.

## 2024-07-15 NOTE — PATIENT INSTRUCTIONS
Type 2 diabetes mellitus, is not at goal, GMI 6.9%. Downloaded and interrogated Jessica continuous glucose sensor. Patient average glucose 151 mg/dl with time in range of  mg/dl 84%, 181-250 mg/dl 15%, greater than 250 mg/dl 1% with CV 20%. Fasting glucose would improve with increase in Trulicity, which is not available due to national shortage.     RX changes:   INCREASE TRULICITY to 3 mg weekly as soon as available. We can increase again to 4.5 mg weekly after at a month of the 3 mg.   CONTINUE FARXIGA 10 mg daily  Education:  interpretation of lab results, blood sugar goals, complications of diabetes mellitus, and nutrition  Hypertension: Managed by primary care and nephrology No changes.   Hyperlipidemia: At goal. Taking. Atorvastatin 10 mg daily.   BMI 39: Instructed to increase whole fruits and vegetables and reduce packaged and processed foods.   CKD stage 3B. Discussed urgency in blood pressure and glucose control. Instructed to follow up with nephrology and cardiology.   Follow up: I recommend diabetes care be 6 months.

## 2024-08-15 DIAGNOSIS — N18.31 STAGE 3A CHRONIC KIDNEY DISEASE (MULTI): ICD-10-CM

## 2024-08-15 DIAGNOSIS — E21.3 HYPERPARATHYROIDISM (MULTI): Primary | ICD-10-CM

## 2024-08-19 RX ORDER — DAPAGLIFLOZIN 10 MG/1
10 TABLET, FILM COATED ORAL
Qty: 90 TABLET | Refills: 3 | Status: SHIPPED | OUTPATIENT
Start: 2024-08-19

## 2024-08-30 ENCOUNTER — TELEPHONE (OUTPATIENT)
Dept: ENDOCRINOLOGY | Facility: CLINIC | Age: 44
End: 2024-08-30
Payer: COMMERCIAL

## 2024-08-30 NOTE — TELEPHONE ENCOUNTER
Trini left a VM stating she's had some issues with her chloé sergio/sensor and asks that you call to advise/help. She may be interested in the reader instead of the sergio

## 2024-09-18 ENCOUNTER — OFFICE VISIT (OUTPATIENT)
Dept: CARDIOLOGY | Facility: CLINIC | Age: 44
End: 2024-09-18
Payer: COMMERCIAL

## 2024-09-18 ENCOUNTER — LAB (OUTPATIENT)
Dept: LAB | Facility: LAB | Age: 44
End: 2024-09-18
Payer: COMMERCIAL

## 2024-09-18 VITALS
RESPIRATION RATE: 20 BRPM | OXYGEN SATURATION: 100 % | HEIGHT: 66 IN | BODY MASS INDEX: 37.9 KG/M2 | SYSTOLIC BLOOD PRESSURE: 160 MMHG | DIASTOLIC BLOOD PRESSURE: 100 MMHG | HEART RATE: 96 BPM | WEIGHT: 235.8 LBS

## 2024-09-18 DIAGNOSIS — E11.69 TYPE 2 DIABETES MELLITUS WITH OTHER SPECIFIED COMPLICATION, WITHOUT LONG-TERM CURRENT USE OF INSULIN: ICD-10-CM

## 2024-09-18 DIAGNOSIS — I10 PRIMARY HYPERTENSION: Primary | ICD-10-CM

## 2024-09-18 DIAGNOSIS — I10 PRIMARY HYPERTENSION: ICD-10-CM

## 2024-09-18 LAB
ANION GAP SERPL CALC-SCNC: 12 MMOL/L (ref 10–20)
BUN SERPL-MCNC: 30 MG/DL (ref 6–23)
CALCIUM SERPL-MCNC: 8.9 MG/DL (ref 8.6–10.6)
CHLORIDE SERPL-SCNC: 110 MMOL/L (ref 98–107)
CO2 SERPL-SCNC: 27 MMOL/L (ref 21–32)
CREAT SERPL-MCNC: 2.33 MG/DL (ref 0.5–1.05)
EGFRCR SERPLBLD CKD-EPI 2021: 26 ML/MIN/1.73M*2
EST. AVERAGE GLUCOSE BLD GHB EST-MCNC: 148 MG/DL
GLUCOSE SERPL-MCNC: 105 MG/DL (ref 74–99)
HBA1C MFR BLD: 6.8 %
POTASSIUM SERPL-SCNC: 4.1 MMOL/L (ref 3.5–5.3)
SODIUM SERPL-SCNC: 145 MMOL/L (ref 136–145)
TSH SERPL-ACNC: 1.32 MIU/L (ref 0.44–3.98)

## 2024-09-18 PROCEDURE — 99215 OFFICE O/P EST HI 40 MIN: CPT | Performed by: NURSE PRACTITIONER

## 2024-09-18 PROCEDURE — 4010F ACE/ARB THERAPY RXD/TAKEN: CPT | Performed by: NURSE PRACTITIONER

## 2024-09-18 PROCEDURE — 3077F SYST BP >= 140 MM HG: CPT | Performed by: NURSE PRACTITIONER

## 2024-09-18 PROCEDURE — 83036 HEMOGLOBIN GLYCOSYLATED A1C: CPT

## 2024-09-18 PROCEDURE — 3060F POS MICROALBUMINURIA REV: CPT | Performed by: NURSE PRACTITIONER

## 2024-09-18 PROCEDURE — 80048 BASIC METABOLIC PNL TOTAL CA: CPT

## 2024-09-18 PROCEDURE — 36415 COLL VENOUS BLD VENIPUNCTURE: CPT

## 2024-09-18 PROCEDURE — 3048F LDL-C <100 MG/DL: CPT | Performed by: NURSE PRACTITIONER

## 2024-09-18 PROCEDURE — 3080F DIAST BP >= 90 MM HG: CPT | Performed by: NURSE PRACTITIONER

## 2024-09-18 PROCEDURE — 84443 ASSAY THYROID STIM HORMONE: CPT

## 2024-09-18 PROCEDURE — 3051F HG A1C>EQUAL 7.0%<8.0%: CPT | Performed by: NURSE PRACTITIONER

## 2024-09-18 PROCEDURE — 3008F BODY MASS INDEX DOCD: CPT | Performed by: NURSE PRACTITIONER

## 2024-09-18 RX ORDER — FUROSEMIDE 40 MG/1
40 TABLET ORAL DAILY
COMMUNITY
End: 2024-09-18 | Stop reason: SDUPTHER

## 2024-09-18 RX ORDER — AMLODIPINE BESYLATE 10 MG/1
10 TABLET ORAL DAILY
Qty: 90 TABLET | Refills: 3 | Status: SHIPPED | OUTPATIENT
Start: 2024-09-18 | End: 2025-09-18

## 2024-09-18 RX ORDER — FUROSEMIDE 40 MG/1
1 TABLET ORAL
COMMUNITY
Start: 2023-12-22 | End: 2024-09-18 | Stop reason: SDUPTHER

## 2024-09-18 RX ORDER — DOXYCYCLINE 100 MG/1
200 CAPSULE ORAL DAILY
COMMUNITY
Start: 2024-09-04

## 2024-09-18 RX ORDER — FUROSEMIDE 40 MG/1
40 TABLET ORAL
Qty: 90 TABLET | Refills: 3 | Status: SHIPPED | OUTPATIENT
Start: 2024-09-18 | End: 2025-09-18

## 2024-09-18 RX ORDER — PEN NEEDLE, DIABETIC 32GX 5/32"
NEEDLE, DISPOSABLE MISCELLANEOUS
COMMUNITY
Start: 2024-06-07

## 2024-09-18 ASSESSMENT — ENCOUNTER SYMPTOMS
GASTROINTESTINAL NEGATIVE: 1
RESPIRATORY NEGATIVE: 1
NEUROLOGICAL NEGATIVE: 1
MUSCULOSKELETAL NEGATIVE: 1
CONSTITUTIONAL NEGATIVE: 1

## 2024-09-18 ASSESSMENT — PAIN SCALES - GENERAL: PAINLEVEL: 0-NO PAIN

## 2024-09-18 NOTE — PROGRESS NOTES
"Chief Complaint:   Follow up    History Of Present Illness:    .Ms Sanchez returns in follow up.  Denies chest pain, sob, palpitations, but has had bilateral pedal edema.  Unfortunately, has been out of some of her meds due to a problem with [employer].         Last Recorded Vitals:  Blood pressure (!) 160/100, pulse 96, resp. rate 20, height 1.676 m (5' 6\"), weight 107 kg (235 lb 12.8 oz), SpO2 100%.     Past Medical History:  Past Medical History:   Diagnosis Date    Adjustment disorder with depressed mood 01/02/2019    Dysfunctional grieving    Encounter for other preprocedural examination 03/12/2018    Pre-op testing    Furuncle of limb, unspecified 08/24/2018    Boil, axilla    Pain in right finger(s) 04/03/2017    Pain of right thumb    Personal history of diseases of the skin and subcutaneous tissue 05/24/2016    History of cellulitis        Past Surgical History:  Past Surgical History:   Procedure Laterality Date    LAPAROSCOPY DIAGNOSTIC / BIOPSY / ASPIRATION / LYSIS  03/18/2014    Exploratory Laparoscopy    TONSILLECTOMY  03/18/2014    Tonsillectomy With Adenoidectomy    TUBAL LIGATION  10/04/2016    Tubal Ligation       Social History:  Social History     Socioeconomic History    Marital status: Single   Tobacco Use    Smoking status: Every Day     Current packs/day: 1.00     Average packs/day: 1 pack/day for 17.0 years (17.0 ttl pk-yrs)     Types: Cigarettes    Smokeless tobacco: Never   Substance and Sexual Activity    Alcohol use: Not Currently    Drug use: Yes     Frequency: 7.0 times per week     Types: Marijuana     Social Determinants of Health     Food Insecurity: No Food Insecurity (2/20/2024)    Hunger Vital Sign     Worried About Running Out of Food in the Last Year: Never true     Ran Out of Food in the Last Year: Never true       Family History:  Family History   Problem Relation Name Age of Onset    Other (cerebral infarction) Mother      Heart failure Mother      Diabetes Mother      " Fibromyalgia Mother      Hypertension Mother      Other (methicillin resistant staphylococcus aureus infection) Mother      Rheum arthritis Mother      Stroke Mother      Arthritis Father      Hypertension Father      Obesity Father      Other (pacemaker placement) Father      Other (uterine leiomyoma) Cousin           Allergies:  Aspirin and Nsaids (non-steroidal anti-inflammatory drug)    Outpatient Medications:  Current Outpatient Medications   Medication Sig Dispense Refill    albuterol 90 mcg/actuation inhaler Inhale 2 puffs every 4 hours if needed for wheezing. 18 g 11    allopurinol (Zyloprim) 100 mg tablet TAKE 1 TABLET BY MOUTH EVERY DAY 90 tablet 3    amLODIPine (Norvasc) 10 mg tablet Take 1 tablet (10 mg) by mouth once daily.      atorvastatin (Lipitor) 10 mg tablet Take 1 tablet (10 mg) by mouth once daily. 30 tablet 11    calcitriol (Rocaltrol) 0.25 mcg capsule Take 1 capsule (0.25 mcg) by mouth every other day. 45 capsule 3    carvedilol (Coreg) 25 mg tablet Take 1 tablet (25 mg) by mouth 2 times a day. 180 tablet 3    chlorthalidone (Hygroton) 25 mg tablet Take 1 tablet (25 mg) by mouth once daily.      cholecalciferol (Vitamin D-3) 125 MCG (5000 UT) capsule Take 1 capsule (125 mcg) by mouth once daily.      cyclobenzaprine (Flexeril) 10 mg tablet TAKE 1 TABLET BY MOUTH EVERYDAY AT BEDTIME 30 tablet 2    doxycycline (Vibramycin) 100 mg capsule Take 2 capsules (200 mg) by mouth once daily.      dulaglutide 3 mg/0.5 mL pen injector Inject 3 mg under the skin 1 (one) time per week. 2 mL 11    DULoxetine (Cymbalta) 20 mg DR capsule Take 1 capsule (20 mg) by mouth once daily. Do not crush or chew. 30 capsule 11    Farxiga 10 mg TAKE 1 TABLET BY MOUTH EVERY DAY IN THE MORNING 90 tablet 3    FreeStyle Jessica 3 [place] misc Use as instructed 1 each 0    FreeStyle Jessica 3 Sensor device CHANGE SENSOR EVERY 14 DAYS AS DIRECTED. 6 each 3    furosemide (Lasix) 40 mg tablet Take 1 tablet (40 mg) by mouth once daily.   "    Sure Comfort Pen Needle 32 gauge x 5/32\" needle 1 EACH BEFORE MEALS AND AT BEDTIME      valsartan (Diovan) 160 mg tablet Take 2 tablets (320 mg) by mouth once daily. 90 tablet 3    alcohol swabs pads, medicated USE TO CLEAN SKIN PRIOR TO INSULIN INJECTION OR BLOOD GLUCOSE CHECK 4 TIMES DAILY 100 each 11    dulaglutide (Trulicity) 1.5 mg/0.5 mL pen injector injection Inject 1.5 mg under the skin 1 (one) time per week. (Patient not taking: Reported on 9/18/2024) 2 mL 11    fluticasone (Flonase) 50 mcg/actuation nasal spray Administer 1 spray into each nostril once daily. 16 g 3    gabapentin (Neurontin) 600 mg tablet Take 1 tablet (600 mg) by mouth 2 times a day.      glucagon 3 mg/actuation spray,non-aerosol USE 1 SPRAY INTRANASALLY AS NEEDED FOR BLOOD SUGAR <70 1 each 3    insulin glargine (Lantus Solostar U-100 Insulin) 100 unit/mL (3 mL) pen INJECT 30 UNITS ONCE DAILY 30 mL 1    medroxyPROGESTERone 150 mg/mL injection Inject 1 mL (150 mg) into the muscle every 3 months. 1 mL 0    OneTouch Delica Plus Lancet 33 gauge misc USE TO CHECK GLUCOSE 4 TIMES A DAY      OneTouch Ultra Test strip CHECK BLOOD SUGARS 4 TIMES DAILY*NOT COVERED* 100 strip 11     No current facility-administered medications for this visit.        Physical Exam:  Cardiovascular:      PMI at left midclavicular line. Normal rate. Regular rhythm. Normal S1. Normal S2.       Murmurs: There is no murmur.      No gallop.  No click. No rub.   Pulses:     Intact distal pulses.   Edema:     Thigh: bilateral 1+ edema of the thigh.     Pretibial: bilateral 1+ edema of the pretibial area.     Ankle: bilateral 1+ edema of the ankle.     Feet: bilateral 1+ edema of the feet.      ROS:  Review of Systems   Constitutional: Negative.   Cardiovascular:  Positive for leg swelling.   Respiratory: Negative.     Skin: Negative.    Musculoskeletal: Negative.    Gastrointestinal: Negative.    Genitourinary: Negative.    Neurological: Negative.           Last " Labs:  CBC -  Lab Results   Component Value Date    WBC 11.1 05/30/2023    HGB 12.2 05/30/2023    HCT 37.8 05/30/2023    MCV 98 05/30/2023     05/30/2023       CMP -  Lab Results   Component Value Date    CALCIUM 9.2 04/08/2024    PHOS 3.0 05/30/2023    PROT 6.9 04/08/2024    ALBUMIN 3.5 04/08/2024    AST 16 04/08/2024    ALT 12 04/08/2024    ALKPHOS 106 04/08/2024    BILITOT 0.3 04/08/2024       LIPID PANEL -   Lab Results   Component Value Date    CHOL 125 04/08/2024    TRIG 144 04/08/2024    HDL 31.9 04/08/2024    CHHDL 3.9 04/08/2024    LDLF 115 (H) 11/23/2022    VLDL 29 04/08/2024    NHDL 93 04/08/2024       RENAL FUNCTION PANEL -   Lab Results   Component Value Date    GLUCOSE 166 (H) 04/08/2024     04/08/2024    K 4.4 04/08/2024     (H) 04/08/2024    CO2 25 04/08/2024    ANIONGAP 12 04/08/2024    BUN 37 (H) 04/08/2024    CREATININE 2.86 (H) 04/08/2024    GFRMALE CANCELED 05/26/2023    CALCIUM 9.2 04/08/2024    PHOS 3.0 05/30/2023    ALBUMIN 3.5 04/08/2024        Lab Results   Component Value Date    BNP 44 05/26/2023    HGBA1C 7.1 (H) 04/08/2024         Assessment/Plan   Problem List Items Addressed This Visit    None    CHF.    Echo 05/2023  CONCLUSIONS:  1. Left ventricular systolic function is normal with a 65-70% estimated ejection fraction.  2. Poorly visualized anatomical structures due to suboptimal image quality.  3. Spectral Doppler shows an impaired relaxation pattern of left ventricular diastolic filling.  4. Compared with the prior exam alessandro 12/3/2021, today's exam is more technically difficult, however there appears to be less MR today ( previously moderate) and the RVSP which was previously moderate to severely elevated could not be estimated today due to insufficient TR. Previously there was mild TR. In addition appears to be improvement of diastolic function ( now only impaired relaxation).     2.  CAD.  No coronary artery calcifications seen on CT scan.    3. HTN    4.   Smoking.  Cessation encouraged.    5.  IDDM    6.  Hyperlipidemia    7.  MIRA    8.  CKD.  Sees nephrology at [facility].      TODD Kramer-CNP

## 2024-09-24 ENCOUNTER — APPOINTMENT (OUTPATIENT)
Dept: NEPHROLOGY | Facility: CLINIC | Age: 44
End: 2024-09-24
Payer: COMMERCIAL

## 2024-10-04 DIAGNOSIS — E78.5 HYPERLIPIDEMIA, UNSPECIFIED HYPERLIPIDEMIA TYPE: Primary | ICD-10-CM

## 2024-10-08 RX ORDER — ATORVASTATIN CALCIUM 10 MG/1
10 TABLET, FILM COATED ORAL DAILY
Qty: 30 TABLET | Refills: 2 | Status: SHIPPED | OUTPATIENT
Start: 2024-10-08

## 2024-10-15 DIAGNOSIS — E78.5 HYPERLIPIDEMIA, UNSPECIFIED HYPERLIPIDEMIA TYPE: ICD-10-CM

## 2024-10-16 RX ORDER — ATORVASTATIN CALCIUM 10 MG/1
10 TABLET, FILM COATED ORAL DAILY
Qty: 30 TABLET | Refills: 10 | OUTPATIENT
Start: 2024-10-16

## 2024-10-17 DIAGNOSIS — E11.69 TYPE 2 DIABETES MELLITUS WITH OTHER SPECIFIED COMPLICATION, WITHOUT LONG-TERM CURRENT USE OF INSULIN: ICD-10-CM

## 2024-10-18 RX ORDER — BLOOD-GLUCOSE SENSOR
EACH MISCELLANEOUS
Qty: 2 EACH | Refills: 2 | Status: SHIPPED | OUTPATIENT
Start: 2024-10-18

## 2024-10-29 PROBLEM — R25.2 CRAMPING OF HANDS: Status: ACTIVE | Noted: 2024-10-29

## 2024-10-29 PROBLEM — R73.9 HYPERGLYCEMIA: Status: ACTIVE | Noted: 2023-10-19

## 2024-10-29 PROBLEM — R60.0 EDEMA OF BOTH LOWER EXTREMITIES: Status: ACTIVE | Noted: 2024-10-29

## 2024-10-29 PROBLEM — M10.9 GOUT: Status: ACTIVE | Noted: 2024-10-29

## 2024-10-29 PROBLEM — J32.9 SINUSITIS: Status: ACTIVE | Noted: 2024-10-29

## 2024-10-29 PROBLEM — J11.1 INFLUENZA: Status: ACTIVE | Noted: 2024-03-01

## 2024-10-29 PROBLEM — M25.569 KNEE PAIN: Status: ACTIVE | Noted: 2024-10-29

## 2024-10-29 PROBLEM — E87.6 HYPOKALEMIA: Status: ACTIVE | Noted: 2024-10-29

## 2024-10-29 PROBLEM — Z20.822 EXPOSURE TO SEVERE ACUTE RESPIRATORY SYNDROME CORONAVIRUS 2 (SARS-COV-2): Status: ACTIVE | Noted: 2024-10-29

## 2024-10-29 PROBLEM — J18.9 PNEUMONIA DUE TO INFECTIOUS ORGANISM: Status: ACTIVE | Noted: 2024-03-02

## 2024-10-29 PROBLEM — M51.369 DEGENERATION OF INTERVERTEBRAL DISC OF LUMBAR REGION: Status: ACTIVE | Noted: 2024-10-29

## 2024-10-29 PROBLEM — N30.00 ACUTE CYSTITIS WITHOUT HEMATURIA: Status: ACTIVE | Noted: 2024-03-01

## 2024-10-29 PROBLEM — I50.32 CHRONIC DIASTOLIC CHF (CONGESTIVE HEART FAILURE): Status: ACTIVE | Noted: 2024-03-02

## 2024-10-29 PROBLEM — N89.8 PRURITUS OF VAGINA: Status: ACTIVE | Noted: 2024-10-29

## 2024-10-29 PROBLEM — N39.0 URINARY TRACT INFECTION WITHOUT HEMATURIA: Status: ACTIVE | Noted: 2024-03-02

## 2024-10-29 PROBLEM — R09.02 HYPOXIA: Status: ACTIVE | Noted: 2024-03-02

## 2024-10-29 PROBLEM — F17.200 TOBACCO DEPENDENCE SYNDROME: Status: ACTIVE | Noted: 2024-10-29

## 2024-10-29 PROBLEM — D72.829 LEUKOCYTOSIS: Status: ACTIVE | Noted: 2024-02-29

## 2024-10-29 PROBLEM — J15.4 STREPTOCOCCAL PNEUMONIA (CMS-HCC): Status: ACTIVE | Noted: 2024-03-01

## 2024-10-29 PROBLEM — G56.21 CUBITAL TUNNEL SYNDROME ON RIGHT: Status: ACTIVE | Noted: 2024-10-29

## 2024-10-29 PROBLEM — M25.539 PAIN IN WRIST: Status: ACTIVE | Noted: 2024-10-29

## 2024-10-29 PROBLEM — R60.0 EDEMA OF LOWER EXTREMITY: Status: ACTIVE | Noted: 2024-10-29

## 2024-10-29 PROBLEM — K04.7 DENTAL ABSCESS: Status: ACTIVE | Noted: 2024-10-29

## 2024-10-29 RX ORDER — GLIPIZIDE 10 MG/1
TABLET, FILM COATED, EXTENDED RELEASE ORAL
COMMUNITY
Start: 2022-12-27

## 2024-10-29 RX ORDER — CLINDAMYCIN PHOSPHATE 10 UG/ML
LOTION TOPICAL
COMMUNITY
Start: 2024-10-07

## 2024-10-29 RX ORDER — PREGABALIN 75 MG/1
1 CAPSULE ORAL
COMMUNITY
Start: 2024-10-02

## 2024-10-30 ENCOUNTER — APPOINTMENT (OUTPATIENT)
Dept: CARDIOLOGY | Facility: CLINIC | Age: 44
End: 2024-10-30
Payer: COMMERCIAL

## 2024-11-04 DIAGNOSIS — I10 ESSENTIAL HYPERTENSION: ICD-10-CM

## 2024-11-07 RX ORDER — ALLOPURINOL 100 MG/1
100 TABLET ORAL DAILY
Qty: 30 TABLET | Refills: 10 | Status: SHIPPED | OUTPATIENT
Start: 2024-11-07

## 2024-11-22 DIAGNOSIS — I10 PRIMARY HYPERTENSION: ICD-10-CM

## 2024-11-22 RX ORDER — FUROSEMIDE 40 MG/1
40 TABLET ORAL
Qty: 90 TABLET | Refills: 3 | Status: SHIPPED | OUTPATIENT
Start: 2024-11-22 | End: 2025-11-22

## 2024-11-25 ENCOUNTER — OFFICE VISIT (OUTPATIENT)
Dept: NEPHROLOGY | Facility: HOSPITAL | Age: 44
End: 2024-11-25
Payer: COMMERCIAL

## 2024-11-25 VITALS
SYSTOLIC BLOOD PRESSURE: 170 MMHG | OXYGEN SATURATION: 100 % | DIASTOLIC BLOOD PRESSURE: 90 MMHG | HEART RATE: 116 BPM | TEMPERATURE: 96.1 F | RESPIRATION RATE: 16 BRPM | WEIGHT: 242.2 LBS | BODY MASS INDEX: 39.09 KG/M2

## 2024-11-25 DIAGNOSIS — E21.3 HYPERPARATHYROIDISM (MULTI): ICD-10-CM

## 2024-11-25 DIAGNOSIS — I10 PRIMARY HYPERTENSION: ICD-10-CM

## 2024-11-25 DIAGNOSIS — N18.4 CKD (CHRONIC KIDNEY DISEASE) STAGE 4, GFR 15-29 ML/MIN (MULTI): ICD-10-CM

## 2024-11-25 DIAGNOSIS — I10 ESSENTIAL HYPERTENSION: Primary | ICD-10-CM

## 2024-11-25 PROCEDURE — 99214 OFFICE O/P EST MOD 30 MIN: CPT | Performed by: INTERNAL MEDICINE

## 2024-11-25 PROCEDURE — 3080F DIAST BP >= 90 MM HG: CPT | Performed by: INTERNAL MEDICINE

## 2024-11-25 PROCEDURE — 3077F SYST BP >= 140 MM HG: CPT | Performed by: INTERNAL MEDICINE

## 2024-11-25 RX ORDER — VALSARTAN 320 MG/1
320 TABLET ORAL DAILY
Qty: 90 TABLET | Refills: 3 | Status: SHIPPED | OUTPATIENT
Start: 2024-11-25 | End: 2025-11-25

## 2024-11-25 RX ORDER — FUROSEMIDE 40 MG/1
40 TABLET ORAL
Qty: 90 TABLET | Refills: 3 | Status: SHIPPED | OUTPATIENT
Start: 2024-11-25 | End: 2025-11-25

## 2024-11-25 NOTE — PROGRESS NOTES
Trini Sanchez   44 y.o.    @@  [facility]/Room: [id number]/Room/bed info not found    Subjective:   The patient is being seen for a routine clinic follow-up of chronic kidney disease. Recently, the disease has been stable. Disease complications:  No hyperkalemia, no hypocalcemia, no hyperphosphatemia, no metabolic acidosis, no coagulopathy, no uremic encephalopathy, no neuropathy and no renal osteodystrophy. The patient is currently asymptomatic. No associated symptoms are reported.       Meds:   Current Outpatient Medications   Medication Sig Dispense Refill    albuterol 90 mcg/actuation inhaler Inhale 2 puffs every 4 hours if needed for wheezing. 18 g 11    alcohol swabs pads, medicated USE TO CLEAN SKIN PRIOR TO INSULIN INJECTION OR BLOOD GLUCOSE CHECK 4 TIMES DAILY 100 each 11    allopurinol (Zyloprim) 100 mg tablet TAKE 1 TABLET BY MOUTH EVERY DAY 30 tablet 10    amLODIPine (Norvasc) 10 mg tablet Take 1 tablet (10 mg) by mouth once daily. 90 tablet 3    atorvastatin (Lipitor) 10 mg tablet Take 1 tablet (10 mg) by mouth once daily. PLEASE MAKE FOLLOW APPOINTMENT FOR FURTHER REFILLS 30 tablet 2    calcitriol (Rocaltrol) 0.25 mcg capsule Take 1 capsule (0.25 mcg) by mouth every other day. 45 capsule 3    carvedilol (Coreg) 25 mg tablet Take 1 tablet (25 mg) by mouth 2 times a day. 180 tablet 3    chlorthalidone (Hygroton) 25 mg tablet Take 1 tablet (25 mg) by mouth once daily.      cholecalciferol (Vitamin D-3) 125 MCG (5000 UT) capsule Take 1 capsule (125 mcg) by mouth once daily.      clindamycin (Cleocin T) 1 % lotion APPLY A THIN LAYER TOPICALLY TO THE AFFECTED AREA ONCE IN THE MORING      cyclobenzaprine (Flexeril) 10 mg tablet TAKE 1 TABLET BY MOUTH EVERYDAY AT BEDTIME 30 tablet 2    doxycycline (Vibramycin) 100 mg capsule Take 2 capsules (200 mg) by mouth once daily.      dulaglutide 3 mg/0.5 mL pen injector Inject 3 mg under the skin 1 (one) time per week. 2 mL 11    DULoxetine (Cymbalta) 20 mg DR capsule Take  "1 capsule (20 mg) by mouth once daily. Do not crush or chew. 30 capsule 11    Farxiga 10 mg TAKE 1 TABLET BY MOUTH EVERY DAY IN THE MORNING 90 tablet 3    fluticasone (Flonase) 50 mcg/actuation nasal spray Administer 1 spray into each nostril once daily. 16 g 3    FreeStyle Jessica 3 [place] misc Use as instructed 1 each 0    FreeStyle Jessica 3 Sensor device CHANGE SENSOR EVERY 14 DAYS AS DIRECTED 2 each 2    furosemide (Lasix) 40 mg tablet Take 1 tablet (40 mg) by mouth once daily. 90 tablet 3    gabapentin (Neurontin) 600 mg tablet Take 1 tablet (600 mg) by mouth 2 times a day.      glipiZIDE XL (Glucotrol XL) 10 mg 24 hr tablet Take by mouth once daily.      glucagon 3 mg/actuation spray,non-aerosol USE 1 SPRAY INTRANASALLY AS NEEDED FOR BLOOD SUGAR <70 1 each 3    insulin glargine (Lantus Solostar U-100 Insulin) 100 unit/mL (3 mL) pen INJECT 30 UNITS ONCE DAILY 30 mL 1    medroxyPROGESTERone 150 mg/mL injection Inject 1 mL (150 mg) into the muscle every 3 months. 1 mL 0    OneTouch Delica Plus Lancet 33 gauge misc USE TO CHECK GLUCOSE 4 TIMES A DAY      OneTouch Ultra Test strip CHECK BLOOD SUGARS 4 TIMES DAILY*NOT COVERED* 100 strip 11    pregabalin (Lyrica) 75 mg capsule Take 1 tablet by mouth every 12 hours.      Sure Comfort Pen Needle 32 gauge x 5/32\" needle 1 EACH BEFORE MEALS AND AT BEDTIME      valsartan (Diovan) 320 mg tablet Take 1 tablet (320 mg) by mouth once daily. 90 tablet 3     No current facility-administered medications for this visit.          ROS:  The patient is awake and oriented. No dizziness or lightheadedness. No chills and no fever. No headaches. No nausea and no vomiting. No shortness of breath. No cough. No sputum. No chest pain. No chest tightness. No abdominal pain. No diarrhea and no constipation. No hematemesis or hemoptysis. No hematuria. No rectal bleeding. No melena. No epistaxis. No urinary symptoms. No flank pain. No leg edema. No leg pain. No weakness. No itching. Overall, the " rest of the review of systems is also negative.  12 point review of systems otherwise negative as stated in HPI.        Physical Examination:        Vitals:    11/25/24 1039   BP: 170/90   Pulse: (!) 116   Resp: 16   Temp: 35.6 °C (96.1 °F)   SpO2: 100%     General: The patient is awake, oriented, and is not in any distress.  Head and Neck: Normocephalic. No periorbital edema.  Eyes: non-icteric  Respiratory: Symmetric air entry. Symmetric chest expansion.No respiratory distress.  Skin: No maculopapular rash.  Musculoskeletal: No peripheral edema in both left and right upper extremities.  No edema in either left or right lower extremities.  Neuro Exam: Speech is fluent. Moves extremities.    Imaging:  === 02/17/23 ===    US RENAL COMPLETE    - Impression -  Unremarkable renal ultrasound within the limitations described above.    I personally reviewed the images/study and I agree with the findings  as stated. This study was interpreted at [place], [place].       Blood Labs:  No results found for this or any previous visit (from the past 24 hours).   Lab Results   Component Value Date    PTH 82.5 04/08/2024    PROTUR NEGATIVE 05/26/2023    PROTUR CANCELED 05/26/2023    PROTUR 4 (L) 02/17/2023    PHOS 3.0 05/30/2023      Lab Results   Component Value Date    GLUCOSE 105 (H) 09/18/2024    CALCIUM 8.9 09/18/2024     09/18/2024    K 4.1 09/18/2024    CO2 27 09/18/2024     (H) 09/18/2024    BUN 30 (H) 09/18/2024    CREATININE 2.33 (H) 09/18/2024         Assessment and Plan:    1. Chronic kidney disease is stage IV. Her last Cr level is 2.33.  No major change in kidney function.  Normal K and Bicarb level.    2. Hypertension. Blood pressure is higher than the goal.  She is on multiple antihypertensive medications but she does not take her medications on regular basis and apparently she has been missing taking her valsartan.  I sent a new prescription for valsartan.   We also talked about low-salt diet.    3. Congestive heart failure.     4. proteinuria. Her last spot urine protein to creatinine ratio showed about 0.32 gram proteinuria. She is on valsartan and Farxiga.     I will see her in about 4 months for follow-up.         Lucho Abdul MD  Senior Attending Physician  Director of Onco-Nephrology Program  Division of Nephrology & Hypertension  [facility]

## 2024-11-29 ENCOUNTER — HOSPITAL ENCOUNTER (OUTPATIENT)
Dept: CARDIOLOGY | Facility: HOSPITAL | Age: 44
Discharge: HOME | End: 2024-11-29
Payer: COMMERCIAL

## 2024-11-29 DIAGNOSIS — I10 PRIMARY HYPERTENSION: ICD-10-CM

## 2024-11-29 PROCEDURE — 93306 TTE W/DOPPLER COMPLETE: CPT

## 2024-12-02 LAB
AORTIC VALVE MEAN GRADIENT: 6 MMHG
AORTIC VALVE PEAK VELOCITY: 1.88 M/S
AV PEAK GRADIENT: 14 MMHG
AVA (PEAK VEL): 2.03 CM2
AVA (VTI): 2.04 CM2
EJECTION FRACTION APICAL 4 CHAMBER: 60.7
EJECTION FRACTION: 63 %
LEFT ATRIUM VOLUME AREA LENGTH INDEX BSA: 43.6 ML/M2
LEFT VENTRICLE INTERNAL DIMENSION DIASTOLE: 4.44 CM (ref 3.5–6)
LEFT VENTRICULAR OUTFLOW TRACT DIAMETER: 1.83 CM
MITRAL VALVE E/A RATIO: 1.14
RIGHT VENTRICLE FREE WALL PEAK S': 12 CM/S
RIGHT VENTRICLE PEAK SYSTOLIC PRESSURE: 63.2 MMHG
TRICUSPID ANNULAR PLANE SYSTOLIC EXCURSION: 2.6 CM

## 2024-12-05 NOTE — PROGRESS NOTES
Primary Care Physician: Alia Walker MD  Patient's Location: [facility] [address]    Date of Visit: 12/10/2024  9:00 AM EST  Location of visit: [facility]   Type of Visit: New  Last visit: Visit date not found    HPI / Summary:   Trini Sanchez is a very pleasant 44 y.o. female presenting for management of Stage C HFpEF. She has a history of resistant hypertension, hyperlipidemia, obesity, type II DM, CKD stage IV and prior medication non-adherence.      Initial CV History:   Established with Dr. Nobles after a hospitalization for COVID, severe HTN and LINDEN/CKD. Noted to have HFpEF with moderate-severe MR, though degree of MR improved to mild on last echo.     Echo : 11/29/24     CONCLUSIONS:   1. The left ventricular systolic function is normal, with a visually estimated ejection fraction of 60-65%.   2. Spectral Doppler shows a Grade II (pseudonormal pattern) of left ventricular diastolic filling with an elevated left atrial pressure.   3. There is moderately increased septal thickness.   4. There is normal right ventricular global systolic function.   5. The left atrium is moderately dilated.   6. Moderately elevated pulmonary artery pressure.    Hospitalizations: 2/27-3/3/24 at [facility] for SOB     BP has been difficult to control.     Today:  Currently denies chest pain, palpitations, shortness of breath. Patient has complaints of dyspnea on exertion, orthopnea, negative PND. Edema noted in BLE. Patient denies headaches, dizziness or recent falls.      Hospitalizations: 2/27-3/3/24 at [facility] for SOB   PM/SHx: CHF, DVT, HLD, HTN, MIRA  Social Hx: Current smoker (1.5 PPD), denies ETOH, uses Marijuana and opiates   Family Hx: Mother who has passed had valve replacement      Interval Hx:     ROS: Full 10 pt review of symptoms of negative unless discussed above.     Objective   Medical History:   She has a past medical history of Adjustment disorder with depressed mood (01/02/2019), Encounter for other  "preprocedural examination (03/12/2018), Furuncle of limb, unspecified (08/24/2018), Pain in right finger(s) (04/03/2017), and Personal history of diseases of the skin and subcutaneous tissue (05/24/2016).  Surgical Hx:   She has a past surgical history that includes Tonsillectomy (03/18/2014); Laparoscopy diagnostic / biopsy / aspiration / lysis (03/18/2014); and Tubal ligation (10/04/2016).   Social Hx:   She reports that she has been smoking cigarettes. She has a 17 pack-year smoking history. She has never used smokeless tobacco. She reports that she does not currently use alcohol. She reports current drug use. Frequency: 7.00 times per week. Drug: Marijuana.  Family Hx:   Her family history includes Arthritis in her father; Diabetes in her mother; Fibromyalgia in her mother; Heart failure in her mother; Hypertension in her father and mother; Obesity in her father; Rheum arthritis in her mother; Stroke in her mother; cerebral infarction in her mother; methicillin resistant staphylococcus aureus infection in her mother; pacemaker placement in her father; uterine leiomyoma in her cousin.   Allergies:   Allergies   Allergen Reactions    Aspirin Itching    Nsaids (Non-Steroidal Anti-Inflammatory Drug) Unknown     Exam:       12/10/2024     9:13 AM 11/25/2024    10:39 AM 9/18/2024     2:11 PM 3/26/2024     3:49 PM 3/26/2024     3:48 PM 3/26/2024     3:39 PM   Vitals   Systolic 171 170 160 137 143 143   Diastolic 103 90 100 86 86 88   BP Location Left arm Left arm Left arm Left arm Right arm Left arm   Heart Rate 96 116 96 116 105 99   Temp  35.6 °C (96.1 °F)       Resp  16 20      Height 1.676 m (5' 6\")  1.676 m (5' 6\")   1.676 m (5' 6\")   Weight (lb) 238 242.2 235.8   221   BMI 38.41 kg/m2 39.09 kg/m2 38.06 kg/m2   35.67 kg/m2   BSA (m2) 2.24 m2 2.26 m2 2.23 m2   2.16 m2   Visit Report Report Report Report Report Report Report     Wt Readings from Last 5 Encounters:   12/10/24 108 kg (238 lb)   11/25/24 110 kg (242 lb " 3.2 oz)   09/18/24 107 kg (235 lb 12.8 oz)   03/26/24 100 kg (221 lb)   02/20/24 99.1 kg (218 lb 8 oz)     GEN: Pleasant, well-appearing, no acute distress.  HEENT: JVP not elevated, no icterus. No HJR  CHEST: No wheeze, good air movement.  CV: Normal rate, regular rhythm. Normal S1, inspiratory split S2, no m/r/g  ABD: Soft, ND, NT  EXT: Warm, well perfused, 2+ LE edema  NEURO: Pleasant, Oriented to plan    Labs:   CMP:  Recent Labs     09/18/24  1441 04/08/24  1125 05/30/23  0611 05/29/23  0638 05/28/23  0803    143 136 137 137   K 4.1 4.4 4.1 3.8 3.3*   * 110* 97* 97* 97*   CO2 27 25 23 26 27   ANIONGAP 12 12 20 18 16   BUN 30* 37* 14 20 31*   CREATININE 2.33* 2.86* 1.67* 1.95* 2.18*   EGFR 26* 20*  --   --   --    MG  --   --  2.12 2.19 2.16     Recent Labs     04/08/24  1125 05/30/23  0611 05/28/23  0803 05/27/23  0814 05/26/23  1809 05/26/23  1708 05/26/23  0823   ALBUMIN 3.5 3.0* 2.7*   < > 3.2*   < > 3.4   ALKPHOS 106  --   --   --  136*  --  163*   ALT 12  --   --   --  20  --  21   AST 16  --   --   --  23  --  19   BILITOT 0.3  --   --   --  0.8  --  0.7    < > = values in this interval not displayed.   CBC:  Recent Labs     05/30/23  0611 05/29/23  0638 05/28/23  0803 05/27/23  0814 05/26/23  1708   WBC 11.1 11.5* 11.5* 12.8* CANCELED   HGB 12.2 11.6* 11.7* 12.4 CANCELED   HCT 37.8 36.1 34.7* 37.0 CANCELED    318 314 329 CANCELED   MCV 98 98 96 96 CANCELED   HEME/ENDO:  Recent Labs     09/18/24  1441 04/08/24  1125 02/28/24  1447 05/26/23  0823 02/16/22  1622 12/03/21  1116   FERRITIN  --   --   --  540*  --   --    IRONSAT  --   --   --  10*  --  22*   TSH 1.32  --   --  1.05  --   --    HGBA1C 6.8* 7.1* 9.2* 19.5*   < >  --     < > = values in this interval not displayed.    CARDIAC:   Recent Labs     05/26/23  1146 05/26/23  0823 12/01/21  2112   TROPHS 18  --   --    BNP 44 57 200*     Recent Labs     04/08/24  1125 11/23/22  1020 08/24/18  1041   CHOL 125 177 228*   LDLF  --  115*  160*   LDLCALC 64  --   --    HDL 31.9 39.5* 41.3   TRIG 144 115 136   MICRO:   Recent Labs     12/01/21  2112   CRP 5.24*   No results found for the last 90 days.      Notable Studies, reviewed:   EKG:   Recent Labs     05/30/23  0723 05/26/23  0824 12/01/21  2104   VENTRATE 112 98 111   ATRRATE 112 98 111   QTCFRED 396 410 432   QRSDUR 84 74 74   QTCCALCB 439 444 478   No results found for this or any previous visit (from the past 4464 hours).  Echocardiogram:   Recent Labs     11/29/24  1502   EF 63   LVIDD 4.44   RV 63.2   RVFRWALLPKSP 12.00   TAPSE 2.6   Transthoracic Echo (TTE) Complete 11/29/2024  PHYSICIAN INTERPRETATION:  Left Ventricle: The left ventricular systolic function is normal, with a visually estimated ejection fraction of 60-65%. There are no regional left ventricular wall motion abnormalities. The left ventricular cavity size is normal. There is moderately increased septal and normal posterior left ventricular wall thickness. Spectral Doppler shows a Grade II (pseudonormal pattern) of left ventricular diastolic filling with an elevated left atrial pressure.  Left Atrium: The left atrium is moderately dilated.  Right Ventricle: The right ventricle is normal in size. There is normal right ventricular global systolic function.  Right Atrium: The right atrium is normal in size.  Aortic Valve: The aortic valve is trileaflet. The aortic valve dimensionless index is 0.77. There is trace aortic valve regurgitation. The peak instantaneous gradient of the aortic valve is 14 mmHg. The mean gradient of the aortic valve is 6 mmHg.  Mitral Valve: The mitral valve is normal in structure. The peak instantaneous gradient of the mitral valve is 19 mmHg. There is mild mitral valve regurgitation. The mitral regurgitant orifice area is 12 mm2. The mitral regurgitant volume is 22.89 ml.  Tricuspid Valve: The tricuspid valve is structurally normal. There is mild tricuspid regurgitation.  Pulmonic Valve: The pulmonic  valve is structurally normal. There is trace pulmonic valve regurgitation.  Pericardium: Trivial pericardial effusion.  Aorta: The aortic root is normal.  Pulmonary Artery: The tricuspid regurgitant velocity is 3.88 m/s, and with an estimated right atrial pressure of 3 mmHg, the estimated pulmonary artery pressure is moderately elevated with the RVSP at 63.2 mmHg.  Systemic Veins: The inferior vena cava appears normal in size, with IVC inspiratory collapse greater than 50%.  In comparison to the previous echocardiogram(s): Compared with study dated 5/30/2023,. Hemodynamic pattern on current echocardiogram now consistent with hypertensive heart disease with acute diastolic heart failure.      CONCLUSIONS:  1. The left ventricular systolic function is normal, with a visually estimated ejection fraction of 60-65%.  2. Spectral Doppler shows a Grade II (pseudonormal pattern) of left ventricular diastolic filling with an elevated left atrial pressure.  3. There is moderately increased septal thickness.  4. There is normal right ventricular global systolic function.  5. The left atrium is moderately dilated.  6. Moderately elevated pulmonary artery pressure.    QUANTITATIVE DATA SUMMARY:    2D MEASUREMENTS:          Normal Ranges:  Ao Root d:       2.70 cm  (2.0-3.7cm)  LAs:             4.57 cm  (2.7-4.0cm)  IVSd:            1.43 cm  (0.6-1.1cm)  LVPWd:           0.69 cm  (0.6-1.1cm)  LVIDd:           4.44 cm  (3.9-5.9cm)  LVIDs:           3.27 cm  LV Mass Index:   75 g/m2  LVEDV Index:     34 ml/m2  LV % FS          26.2 %      LA VOLUME:                    Normal Ranges:  LA Vol A4C:        93.8 ml    (22+/-6mL/m2)  LA Vol A2C:        87.1 ml  LA Vol BP:         94.7 ml  LA Vol Index A4C:  43.3ml/m2  LA Vol Index A2C:  40.1 ml/m2  LA Vol Index BP:   43.6 ml/m2  LA Area A4C:       27.4 cm2  LA Area A2C:       25.2 cm2  LA Major Axis A4C: 6.8 cm  LA Major Axis A2C: 6.2 cm  LA Volume Index:   41.7 ml/m2      RA VOLUME BY  A/L METHOD:          Normal Ranges:  RA Area A4C:             13.6 cm2      AORTA MEASUREMENTS:         Normal Ranges:  Asc Ao, d:          2.90 cm (2.1-3.4cm)      LV SYSTOLIC FUNCTION BY 2D PLANIMETRY (MOD):  Normal Ranges:  EF-A4C View:    61 % (>=55%)  EF-A2C View:    58 %  EF-Biplane:     61 %  EF-Visual:      63 %  LV EF Reported: 63 %      LV DIASTOLIC FUNCTION:           Normal Ranges:  MV Peak E:             1.62 m/s  (0.7-1.2 m/s)  MV Peak A:             1.43 m/s  (0.42-0.7 m/s)  E/A Ratio:             1.14      (1.0-2.2)  MV e'                  0.065 m/s (>8.0)  MV lateral e'          0.06 m/s  MV medial e'           0.07 m/s  E/e' Ratio:            24.98     (<8.0)      MITRAL VALVE:           Normal Ranges:  MV Vmax:      2.20 m/s  (<=1.3m/s)  MV peak P.4 mmHg (<5mmHg)  MV mean P.3 mmHg  (<2mmHg)  MV VTI:       38.23 cm  (10-13cm)  MV DT:        194 msec  (150-240msec)      MITRAL INSUFFICIENCY:             Normal Ranges:  MR VTI:               186.38 cm  MR Vmax:              644.09 cm/s  MR Volume:            22.89 ml  MR Flow Rt:           79.12 ml/s  MR EROA:              12 mm2      AORTIC VALVE:                      Normal Ranges:  AoV Vmax:                1.88 m/s  (<=1.7m/s)  AoV Peak P.2 mmHg (<20mmHg)  AoV Mean P.2 mmHg  (1.7-11.5mmHg)  LVOT Max Yonathan:            1.45 m/s  (<=1.1m/s)  AoV VTI:                 29.74 cm  (18-25cm)  LVOT VTI:                23.01 cm  LVOT Diameter:           1.83 cm   (1.8-2.4cm)  AoV Area, VTI:           2.04 cm2  (2.5-5.5cm2)  AoV Area,Vmax:           2.03 cm2  (2.5-4.5cm2)  AoV Dimensionless Index: 0.77      AORTIC INSUFFICIENCY:  AI Vmax:       5.10 m/s  AI Half-time:  189 msec  AI Decel Time: 650 msec  AI Decel Rate: 783.72 cm/s2      RIGHT VENTRICLE:  TAPSE: 26.0 mm  RV s'  0.12 m/s      TRICUSPID VALVE/RVSP:          Normal Ranges:  Peak TR Velocity:     3.88 m/s  Est. RA Pressure:     3 mmHg  RV Syst Pressure:      63 mmHg  (< 30mmHg)  IVC Diam:             1.30 cm        Coronary Angiography: No results found for this or any previous visit from the past 1800 days.    Right Heart Cath: No results found for this or any previous visit from the past 1800 days.    Cardiac Scoring: No results found for this or any previous visit from the past 1800 days.    Cardiac MRI: No results found for this or any previous visit from the past 1800 days.    Nuclear:No results found for this or any previous visit from the past 1800 days.    Metabolic Stress: No results found for this or any previous visit from the past 1800 days.      Current Outpatient Medications   Medication Instructions    albuterol 90 mcg/actuation inhaler 2 puffs, inhalation, Every 4 hours PRN    alcohol swabs pads, medicated USE TO CLEAN SKIN PRIOR TO INSULIN INJECTION OR BLOOD GLUCOSE CHECK 4 TIMES DAILY    allopurinol (ZYLOPRIM) 100 mg, oral, Daily    amLODIPine (NORVASC) 10 mg, oral, Daily    atorvastatin (LIPITOR) 10 mg, oral, Daily, PLEASE MAKE FOLLOW APPOINTMENT FOR FURTHER REFILLS    calcitriol (ROCALTROL) 0.25 mcg, oral, Every other day    carvedilol (COREG) 25 mg, oral, 2 times daily    chlorthalidone (HYGROTON) 25 mg, Daily    cholecalciferol (VITAMIN D-3) 5,000 Units, Daily    clindamycin (Cleocin T) 1 % lotion APPLY A THIN LAYER TOPICALLY TO THE AFFECTED AREA ONCE IN THE MORING    cyclobenzaprine (FLEXERIL) 10 mg, oral, Nightly    doxycycline (VIBRAMYCIN) 200 mg, Daily    dulaglutide 3 mg, subcutaneous, Once Weekly    DULoxetine (CYMBALTA) 20 mg, oral, Daily, Do not crush or chew.    Farxiga 10 mg, oral, Daily before breakfast    fluticasone (Flonase) 50 mcg/actuation nasal spray 1 spray, Each Nostril, Daily    FreeStyle Jessica 3 [place] misc Use as instructed    FreeStyle Jessica 3 Sensor device CHANGE SENSOR EVERY 14 DAYS AS DIRECTED    furosemide (LASIX) 40 mg, oral, 2 times daily (morning and late afternoon)    gabapentin (NEURONTIN) 600 mg, 2 times daily     "glipiZIDE XL (Glucotrol XL) 10 mg 24 hr tablet Daily RT    glucagon 3 mg/actuation spray,non-aerosol USE 1 SPRAY INTRANASALLY AS NEEDED FOR BLOOD SUGAR <70    insulin glargine (Lantus Solostar U-100 Insulin) 100 unit/mL (3 mL) pen INJECT 30 UNITS ONCE DAILY    medroxyPROGESTERone (DEPO-PROVERA) 150 mg, intramuscular, Every 3 months    OneTouch Delica Plus Lancet 33 gauge misc USE TO CHECK GLUCOSE 4 TIMES A DAY    OneTouch Ultra Test strip CHECK BLOOD SUGARS 4 TIMES DAILY*NOT COVERED*    pregabalin (Lyrica) 75 mg capsule 1 tablet, Every 12 hours scheduled (0630,1830)    Sure Comfort Pen Needle 32 gauge x 5/32\" needle 1 EACH BEFORE MEALS AND AT BEDTIME    valsartan (DIOVAN) 320 mg, oral, Daily      Notable Therapies: *GDMT*   Class  Agent SAFETY    *ARNI* / ACE / ARB  valsartan 320mg daily Last BP: (!) 171/103, Last BUN/Cr (GFR): 9/18/2024: 30/2.33 (26)    *Beta-Blocker*  carvedilol 25mg BID Last HR: 96    *MRA*   Last K: 9/18/2024: 4.1     *SGLT2*  Farxiga 10mg daily Last A1c 9/18/2024: 6.8     Diuretic  chlorthalidone 25mg daily  furosemide 40mg daily Last BNP: 5/26/2023: 44    Hydralazine/ISDN       Digoxin  Last Digoxin level: No results found for requested labs within last 3650 days.    Anticoagulation   Last Hgb: 5/30/2023: 12.2    Anti-arrhythmic   Last QTc: 5/30/2023: 439    Antiplatelet   Last Platelet: 5/30/2023: 349    Lipid-Lowering  atorvastatin 10mg daily Last Tchol 4/8/2024: 125 / LDL 11/23/2022: 115 or 4/8/2024: 64 / HDL 4/8/2024: 31.9 / TRIG 4/8/2024: 144    Other   amLODIPine 10mg daily  dulaglutide     Device(s)   EF: 11/29/2024: 63%, QRS: 5/30/2023: 84ms    Cardiac Rehab,   if EF <35/MI/OHS        HFA-PEFF Score:  (echo/lab independently reviewed)   Functional Morphological  Biomarker (SR) Biomarker (AF)   Major  (2pts) [x] septal e’ < 7 cm/s or lateral e’ < 10 cm/s or  [x] Average E/e’ >= 15 or  [x] TR velocity > 2.8 m/s (PASP > 35 mmHg) [x] EMMA > 34 ml/m² or  [] LVMI >= 149/122 g/m² (m/w) and " RWT > 0.42 []  NT-proBNP > 220 pg/ml or  []  BNP > 80 pg/ml []   NT-proBNP > 660 pg/ml or  []   BNP > 240 pg/ml   Minor  (1pt) [] Average E/e’ 9-14 or  []  GLS < 16 % []  EMMA 29-34 ml/m² or  []  LVMI >= 115/95 g/m² (m/w) or  []  RWT > 0.42 or  [x]  LV wall thickness > 12 mm []   NT-proBNP 125-220 pg/ml or  [x]   BNP 35-80 pg/ml - NT-proBNP 365-660 pg/ml or  - -240 pg/ml   Total  >= 5 points: HFpEF  2-4 points: Diastolic Stress Test or Invasive Hemodynamic Measurements 2 2 1      Problems Addressed:   # HFpEF / Chronic Systolic Heart Failure, last EF 11/29/2024: 63%, HFA-PEFF score of 5/6 (c/w HFpEF)  # Resistant Hypertension: Last BP: (!) 171/103. Reports compliance. She is hypervolemic and will augment diuretics.   # Hyperlipidemia: Last Tchol 4/8/2024: 125 / LDL  64 / HDL 31.9 / TRIG 144. Continue statin  # Type II Diabetes Mellitus: Last A1c 9/18/2024: 6.8. Significant improvement in gylcemic control on meds. Continue  # Obesity: Last BMI: 38.43. Continue SGLT2i, diet, exercise, GLP1  # CKD: Last BUN/Cr (GFR): 9/18/2024: 30/2.33 (26). Close monitoring. Continue nehrology f/u  - cornerstone of management is aggressive blood pressure control  - Increase furosemide to 40mg twice daily given hypertensive and hyerpvolemic. Monitor electrolytes with labs in 1 week  -- consider adding metolazone if volume overload is present.  -- Consider adding finerenone if potassium levels allow.  -- if BP still a concern consider adding clonidine    # Tobacco Use: She reports that she has been smoking cigarettes. She has a 17 pack-year smoking history. She has never used smokeless tobacco.. 3 minutes spent discussing cessation  # Non-prescription opiate use:  recommend stopping  # Marijuana use: recommend cessation    Patient Instructions   If you have any questions or need cardiac medication refills, please call the Heart Failure office at [phone], option 6.  You may also contact the  Heart Failure Nursing team via  email at [email or web address] (Please include your name and date of birth). To reach Dr. Martin's office please call [phone] / Fax: [phone]. To schedule an office appointment call [phone].     If I placed a referral today for a specialist/procedure, please call the general scheduling line at [phone]. You may also schedule appointments on the [facility] sergio. For radiology scheduling (Cardiac calcium score, CTA with HeartFlow, Cardiac MRI, NM cardiac stress test, PET viability study) the phone number is [phone].     - Continue current medications with the exception of:   -- increase furosemide from 40mg daily to 40mg twice daily  - Labwork: 1 week  - Imaging/Procedures: none  - Referrals:   -- tobacco cessation counseling  - Followup: 3 months       Orders:  Orders Placed This Encounter   Procedures    Referral to Tobacco Cessation Counseling    ECG 12 Lead      Followup Appts:  Future Appointments   Date Time Provider Department Center   3/18/2025 10:30 AM Talat Martin MD [id number] [facility]       Time Spent: I spent 45 minutes reviewing medical testing, obtaining medical history and counselling and educating on diagnosis and documenting clinical encounter. The encounter involved a comprehensive review of extensive data, including prior medical records, imaging studies, laboratory results, and consultations, followed by in-depth counseling regarding the patient's complex cardiac condition and comorbidities. We discussed treatment options, risks and benefits, and recommendations for ongoing care and careful monitoring of adverse effects from medications.     ____________________________________________________________  Talat Martin MD  Section of Advanced Heart Failure and Cardiac Transplantation  Division of Cardiovascular Medicine  [facility]  [facility]

## 2024-12-10 ENCOUNTER — OFFICE VISIT (OUTPATIENT)
Dept: CARDIOLOGY | Facility: HOSPITAL | Age: 44
End: 2024-12-10
Payer: COMMERCIAL

## 2024-12-10 VITALS
OXYGEN SATURATION: 96 % | SYSTOLIC BLOOD PRESSURE: 171 MMHG | BODY MASS INDEX: 38.25 KG/M2 | HEART RATE: 96 BPM | DIASTOLIC BLOOD PRESSURE: 103 MMHG | WEIGHT: 238 LBS | HEIGHT: 66 IN

## 2024-12-10 DIAGNOSIS — I10 PRIMARY HYPERTENSION: ICD-10-CM

## 2024-12-10 DIAGNOSIS — I50.33 ACUTE ON CHRONIC HEART FAILURE WITH PRESERVED EJECTION FRACTION: ICD-10-CM

## 2024-12-10 DIAGNOSIS — I50.9 CONGESTIVE HEART FAILURE, UNSPECIFIED HF CHRONICITY, UNSPECIFIED HEART FAILURE TYPE: Primary | ICD-10-CM

## 2024-12-10 DIAGNOSIS — Z72.0 TOBACCO ABUSE: ICD-10-CM

## 2024-12-10 PROCEDURE — 93005 ELECTROCARDIOGRAM TRACING: CPT | Performed by: STUDENT IN AN ORGANIZED HEALTH CARE EDUCATION/TRAINING PROGRAM

## 2024-12-10 PROCEDURE — 99406 BEHAV CHNG SMOKING 3-10 MIN: CPT | Performed by: STUDENT IN AN ORGANIZED HEALTH CARE EDUCATION/TRAINING PROGRAM

## 2024-12-10 PROCEDURE — 3080F DIAST BP >= 90 MM HG: CPT | Performed by: STUDENT IN AN ORGANIZED HEALTH CARE EDUCATION/TRAINING PROGRAM

## 2024-12-10 PROCEDURE — 99215 OFFICE O/P EST HI 40 MIN: CPT | Performed by: STUDENT IN AN ORGANIZED HEALTH CARE EDUCATION/TRAINING PROGRAM

## 2024-12-10 PROCEDURE — 3077F SYST BP >= 140 MM HG: CPT | Performed by: STUDENT IN AN ORGANIZED HEALTH CARE EDUCATION/TRAINING PROGRAM

## 2024-12-10 PROCEDURE — 3008F BODY MASS INDEX DOCD: CPT | Performed by: STUDENT IN AN ORGANIZED HEALTH CARE EDUCATION/TRAINING PROGRAM

## 2024-12-10 RX ORDER — FUROSEMIDE 40 MG/1
40 TABLET ORAL
Qty: 180 TABLET | Refills: 3 | Status: SHIPPED | OUTPATIENT
Start: 2024-12-10 | End: 2025-12-10

## 2024-12-10 ASSESSMENT — PAIN SCALES - GENERAL: PAINLEVEL_OUTOF10: 0-NO PAIN

## 2024-12-10 NOTE — PROGRESS NOTES
44 year old female here for cardiovascular evaluation for heart failure.     PM/SHx: CHF, DVT, HLD, HTN, MIRA  Social Hx: Current smoker (1.5 PPD), denies ETOH, uses Marijuana and opiates   Family Hx: Mother who has passed had valve replacement     Interval Hx:   Currently denies chest pain, palpitations, shortness of breath. Patient has complaints of dyspnea on exertion, orthopnea, negative PND. Edema noted in BLE. Patient denies headaches, dizziness or recent falls.     Hospitalizations: 2/27-3/3/24 at [facility] for SOB       Testing:  Echo : 11/29/24    CONCLUSIONS:   1. The left ventricular systolic function is normal, with a visually estimated ejection fraction of 60-65%.   2. Spectral Doppler shows a Grade II (pseudonormal pattern) of left ventricular diastolic filling with an elevated left atrial pressure.   3. There is moderately increased septal thickness.   4. There is normal right ventricular global systolic function.   5. The left atrium is moderately dilated.   6. Moderately elevated pulmonary artery pressure.

## 2024-12-10 NOTE — PATIENT INSTRUCTIONS
If you have any questions or need cardiac medication refills, please call the Heart Failure office at [phone], option 6.  You may also contact the  Heart Failure Nursing team via email at [email or web address] (Please include your name and date of birth). To reach Dr. Martin's office please call [phone] / Fax: [phone]. To schedule an office appointment call [phone].     If I placed a referral today for a specialist/procedure, please call the general scheduling line at [phone]. You may also schedule appointments on the [facility] sergio. For radiology scheduling (Cardiac calcium score, CTA with HeartFlow, Cardiac MRI, NM cardiac stress test, PET viability study) the phone number is [phone].     - Continue current medications with the exception of:   -- increase furosemide from 40mg daily to 40mg twice daily  - Labwork: 1 week  - Imaging/Procedures: none  - Referrals:   -- tobacco cessation counseling  - Followup: 3 months

## 2024-12-11 LAB
ATRIAL RATE: 95 BPM
P AXIS: 58 DEGREES
P OFFSET: 215 MS
P ONSET: 160 MS
PR INTERVAL: 122 MS
Q ONSET: 221 MS
QRS COUNT: 15 BEATS
QRS DURATION: 74 MS
QT INTERVAL: 364 MS
QTC CALCULATION(BAZETT): 457 MS
QTC FREDERICIA: 424 MS
R AXIS: 49 DEGREES
T AXIS: 43 DEGREES
T OFFSET: 403 MS
VENTRICULAR RATE: 95 BPM

## 2024-12-30 ENCOUNTER — OFFICE VISIT (OUTPATIENT)
Dept: PRIMARY CARE | Facility: CLINIC | Age: 44
End: 2024-12-30
Payer: COMMERCIAL

## 2024-12-30 VITALS
OXYGEN SATURATION: 99 % | SYSTOLIC BLOOD PRESSURE: 149 MMHG | HEART RATE: 92 BPM | RESPIRATION RATE: 16 BRPM | WEIGHT: 238 LBS | BODY MASS INDEX: 38.25 KG/M2 | TEMPERATURE: 97.8 F | HEIGHT: 66 IN | DIASTOLIC BLOOD PRESSURE: 93 MMHG

## 2024-12-30 DIAGNOSIS — Z12.11 COLON CANCER SCREENING: Primary | ICD-10-CM

## 2024-12-30 DIAGNOSIS — Z12.31 ENCOUNTER FOR SCREENING MAMMOGRAM FOR MALIGNANT NEOPLASM OF BREAST: ICD-10-CM

## 2024-12-30 DIAGNOSIS — Z87.39 PERSONAL HISTORY OF OTHER DISEASES OF THE MUSCULOSKELETAL SYSTEM AND CONNECTIVE TISSUE: ICD-10-CM

## 2024-12-30 DIAGNOSIS — Z72.0 TOBACCO ABUSE: ICD-10-CM

## 2024-12-30 DIAGNOSIS — M25.569 KNEE PAIN, UNSPECIFIED CHRONICITY, UNSPECIFIED LATERALITY: ICD-10-CM

## 2024-12-30 DIAGNOSIS — R20.2 PARESTHESIA OF BOTH HANDS: ICD-10-CM

## 2024-12-30 PROCEDURE — 3077F SYST BP >= 140 MM HG: CPT | Performed by: INTERNAL MEDICINE

## 2024-12-30 PROCEDURE — 99214 OFFICE O/P EST MOD 30 MIN: CPT | Performed by: INTERNAL MEDICINE

## 2024-12-30 PROCEDURE — 3008F BODY MASS INDEX DOCD: CPT | Performed by: INTERNAL MEDICINE

## 2024-12-30 PROCEDURE — 3080F DIAST BP >= 90 MM HG: CPT | Performed by: INTERNAL MEDICINE

## 2024-12-30 RX ORDER — CYCLOBENZAPRINE HCL 10 MG
10 TABLET ORAL NIGHTLY
Qty: 90 TABLET | Refills: 1 | Status: SHIPPED | OUTPATIENT
Start: 2024-12-30

## 2024-12-30 RX ORDER — VARENICLINE TARTRATE 1 MG/1
0.5 TABLET, FILM COATED ORAL 2 TIMES DAILY
Qty: 60 TABLET | Refills: 1 | Status: SHIPPED | OUTPATIENT
Start: 2024-12-30 | End: 2025-04-29

## 2024-12-30 RX ORDER — DULOXETIN HYDROCHLORIDE 60 MG/1
60 CAPSULE, DELAYED RELEASE ORAL DAILY
Qty: 90 CAPSULE | Refills: 1 | Status: SHIPPED | OUTPATIENT
Start: 2024-12-30 | End: 2025-06-28

## 2024-12-30 SDOH — ECONOMIC STABILITY: FOOD INSECURITY: WITHIN THE PAST 12 MONTHS, YOU WORRIED THAT YOUR FOOD WOULD RUN OUT BEFORE YOU GOT MONEY TO BUY MORE.: NEVER TRUE

## 2024-12-30 SDOH — ECONOMIC STABILITY: FOOD INSECURITY: WITHIN THE PAST 12 MONTHS, THE FOOD YOU BOUGHT JUST DIDN'T LAST AND YOU DIDN'T HAVE MONEY TO GET MORE.: NEVER TRUE

## 2024-12-30 ASSESSMENT — ENCOUNTER SYMPTOMS
VOMITING: 0
SINUS PAIN: 1
LIGHT-HEADEDNESS: 0
SHORTNESS OF BREATH: 0
DIARRHEA: 1
ABDOMINAL PAIN: 0
DIZZINESS: 0
SINUS PRESSURE: 1
OCCASIONAL FEELINGS OF UNSTEADINESS: 0
FEVER: 0
PALPITATIONS: 0
UNEXPECTED WEIGHT CHANGE: 0
NUMBNESS: 1
ARTHRALGIAS: 1
APPETITE CHANGE: 1
RHINORRHEA: 1
DEPRESSION: 0
CHILLS: 0
LOSS OF SENSATION IN FEET: 0
HEADACHES: 1
NAUSEA: 0

## 2024-12-30 ASSESSMENT — PAIN SCALES - GENERAL: PAINLEVEL_OUTOF10: 5

## 2024-12-30 ASSESSMENT — LIFESTYLE VARIABLES
HOW MANY STANDARD DRINKS CONTAINING ALCOHOL DO YOU HAVE ON A TYPICAL DAY: PATIENT DOES NOT DRINK
HOW MANY STANDARD DRINKS CONTAINING ALCOHOL DO YOU HAVE ON A TYPICAL DAY: PATIENT DOES NOT DRINK

## 2024-12-30 ASSESSMENT — PATIENT HEALTH QUESTIONNAIRE - PHQ9
SUM OF ALL RESPONSES TO PHQ9 QUESTIONS 1 AND 2: 0
2. FEELING DOWN, DEPRESSED OR HOPELESS: NOT AT ALL
1. LITTLE INTEREST OR PLEASURE IN DOING THINGS: NOT AT ALL

## 2024-12-30 NOTE — PROGRESS NOTES
Subjective   Patient ID: Trini Sanchez is a 44 y.o. female who presents for Follow-up.      #Insomnia  - going on for years, not getting better  - difficulty with falling and staying as asleep  - melatonin 80 mg + 70 mg of benadryl, not helping  - usually also takes muscle relaxants to help with sleep but has been out for a month  - Has always been taking something for 20 years  - Mind is racing all the time     #Anxiety  - taking duloxetine, haven't seem to be helpful  - Has seen psychologist/therapist in the past and didn't find it helpful  - ready to go up in dose  - Has taken other medications before, but does not recall the names    #HTN  -following with cardiology and nephrology  -cardiology recently increased furosemide dose to 80 mg (40 mg BID)  -Nephrology refilled valsartan  - runs 140s/80s at home    #DM  - Following with endo, last saw them 7/2024  - per endo INCREASE TRULICITY to 3 mg weekly as soon as available. CONTINUE FARXIGA 10 mg daily    #CHF  - Following with cardiology, last saw them 12/2024  - increased furosemide from 40mg daily to 40mg twice daily    #CKD III/IV  - following with nephrology, last saw them 11/2024  - per neprho:  Her last Cr level is 2.33.  No major change in kidney function.  Normal K and Bicarb level.  Her last spot urine protein to creatinine ratio showed about 0.32 gram proteinuria. She is on valsartan and Farxiga    #Foot ulcer  - recently burned foot by sitting in front of the heater  -  seeing [facility] dpm @ [facility]  - has her abx and is currently wearing a boot on the left foot    #Smoking   - Interested in smoking cessation  - gum and pathces dont work    Need refills  - cyclobenzaprine  - lyrica         Review of Systems   Constitutional:  Positive for appetite change. Negative for chills, fever and unexpected weight change.        Loss of appetite for a few weeks   HENT:  Positive for rhinorrhea, sinus pressure and sinus pain.     Respiratory:  Negative for shortness of breath.    Cardiovascular:  Positive for leg swelling. Negative for chest pain and palpitations.        Bilateral LE edmea   Gastrointestinal:  Positive for diarrhea. Negative for abdominal pain, nausea and vomiting.        Currently on antibiotics   Musculoskeletal:  Positive for arthralgias.        Knee pain   Neurological:  Positive for numbness and headaches. Negative for dizziness and light-headedness.        LE numbness       Objective   There were no vitals taken for this visit.    Physical Exam  Constitutional:       General: She is not in acute distress.     Appearance: She is obese.   HENT:      Head: Normocephalic and atraumatic.   Cardiovascular:      Rate and Rhythm: Regular rhythm. Tachycardia present.   Pulmonary:      Effort: Pulmonary effort is normal. No respiratory distress.      Breath sounds: Normal breath sounds.   Musculoskeletal:      Right lower leg: Edema present.      Left lower leg: Edema present.   Skin:     General: Skin is warm and dry.   Neurological:      General: No focal deficit present.      Mental Status: She is alert and oriented to person, place, and time.         Assessment/Plan   Trini Sanchez is a 44 y.o. female who presents for Follow-up. Patient has a lot of co morbidities, but is doing her best to keep on top of them. Today we discussed,    #insomnia  - discussed seeing sleep medicine  - patient is already seeing many doctors, and will have to think about it  - also discussed how improvement in anxiety symptoms may help with insomnia    #anxiety  - symptoms still uncontrolled  -Increase duloxetine to 60 mg per day      #HTN:   - BP not at goal  - cont valsartan, chlorthalidone  - low salt diet  - continue to follow up with nephrology    #DM:   -cont Farxiga, trulicity per endocrine    #CHF:   - continue with med changes per cardiology  - continue to follow up with cardiology    #CKD III/IV:  - stable  - continue to follow up  with nephrology    #Mammogram  - overdue     #Colon Cancer Screening  - cologuard    #Smoking cessation  - trial of chantix    Virtual visit in 1 month to discuss chantix and duloxetine  RTO in April 2024    charlotte Mg MSBibiana  12/30/24   4:41 PM    I evaluated the patient and personally participated in the key components, including history, physical examination and medical decision making.  I agree with the student's findings and plan as documented and have discussed the case and management of the patient's care with the student and patient. Greater than 40  minutes was spent in the care and coordination of care of the patient.

## 2025-01-03 LAB — NONINV COLON CA DNA+OCC BLD SCRN STL QL: NORMAL

## 2025-01-14 ENCOUNTER — APPOINTMENT (OUTPATIENT)
Dept: CARDIOLOGY | Facility: CLINIC | Age: 45
End: 2025-01-14
Payer: COMMERCIAL

## 2025-01-30 ENCOUNTER — TELEMEDICINE (OUTPATIENT)
Dept: PRIMARY CARE | Facility: CLINIC | Age: 45
End: 2025-01-30
Payer: COMMERCIAL

## 2025-01-30 DIAGNOSIS — E78.5 HYPERLIPIDEMIA, UNSPECIFIED HYPERLIPIDEMIA TYPE: ICD-10-CM

## 2025-01-30 DIAGNOSIS — G47.00 INSOMNIA, UNSPECIFIED TYPE: Primary | ICD-10-CM

## 2025-01-30 DIAGNOSIS — E66.812 OBESITY, CLASS II, BMI 35-39.9: ICD-10-CM

## 2025-01-30 DIAGNOSIS — F17.200 TOBACCO DEPENDENCE SYNDROME: ICD-10-CM

## 2025-01-30 PROCEDURE — 99214 OFFICE O/P EST MOD 30 MIN: CPT | Performed by: INTERNAL MEDICINE

## 2025-01-30 RX ORDER — TRAZODONE HYDROCHLORIDE 50 MG/1
50 TABLET ORAL NIGHTLY PRN
Qty: 30 TABLET | Refills: 1 | Status: SHIPPED | OUTPATIENT
Start: 2025-01-30 | End: 2026-01-30

## 2025-01-30 RX ORDER — VARENICLINE TARTRATE 1 MG/1
1 TABLET, FILM COATED ORAL 2 TIMES DAILY
Qty: 60 TABLET | Refills: 2 | Status: SHIPPED | OUTPATIENT
Start: 2025-01-30 | End: 2025-04-30

## 2025-01-30 ASSESSMENT — ENCOUNTER SYMPTOMS
ABDOMINAL PAIN: 0
NAUSEA: 0
SHORTNESS OF BREATH: 0
FEVER: 0
CHILLS: 0
VOMITING: 0

## 2025-01-30 NOTE — PROGRESS NOTES
Subjective   Patient ID: Trini Sanchez is a 44 y.o. female who presents for virtual visit. Virtual or Telephone Consent    A telephone visit (audio only) between the patient (at the originating site) and the provider (at the distant site) was utilized to provide this telehealth service.   Verbal consent was requested and obtained from Trini Sanchez on this date, 01/30/25 for a telehealth visit.      Spoke with patient. The Chantix at 1.0 mg daily has slowed her smoking urge down. Also the increased dose of Duloxetine is working too. Still reporting bad insomnia. Never had a chance to do the sleep study for probable sleep apnea.          Review of Systems   Constitutional:  Negative for chills and fever.   Respiratory:  Negative for shortness of breath.    Cardiovascular:  Negative for chest pain.   Gastrointestinal:  Negative for abdominal pain, nausea and vomiting.       Objective   There were no vitals taken for this visit.    Physical Exam  Virtual visit    Assessment/Plan   Insomnia: will start trial of Trazadone and refer to Sleep Medicine.  Tobacco dependence: increase Chantix to 1.0 mg twice a day. New script sent,  Virtual visit in  weeks for follow up of Chantix and insomnia.

## 2025-01-31 RX ORDER — ATORVASTATIN CALCIUM 10 MG/1
10 TABLET, FILM COATED ORAL DAILY
Qty: 30 TABLET | Refills: 5 | Status: SHIPPED | OUTPATIENT
Start: 2025-01-31

## 2025-02-06 DIAGNOSIS — E11.69 TYPE 2 DIABETES MELLITUS WITH OTHER SPECIFIED COMPLICATION, WITHOUT LONG-TERM CURRENT USE OF INSULIN: ICD-10-CM

## 2025-02-06 RX ORDER — BLOOD-GLUCOSE,RECEIVER,CONT
EACH MISCELLANEOUS
Qty: 1 EACH | Refills: 0 | Status: SHIPPED | OUTPATIENT
Start: 2025-02-06

## 2025-02-06 NOTE — TELEPHONE ENCOUNTER
Refill request faxed from pharmacy for chloé reader. Sent to provider for approval.    Deborah Mijares RN

## 2025-02-11 ENCOUNTER — HOSPITAL ENCOUNTER (OUTPATIENT)
Dept: RADIOLOGY | Facility: HOSPITAL | Age: 45
Discharge: HOME | End: 2025-02-11
Payer: COMMERCIAL

## 2025-02-11 VITALS — WEIGHT: 238 LBS | BODY MASS INDEX: 38.25 KG/M2 | HEIGHT: 66 IN

## 2025-02-11 DIAGNOSIS — Z12.31 ENCOUNTER FOR SCREENING MAMMOGRAM FOR MALIGNANT NEOPLASM OF BREAST: ICD-10-CM

## 2025-02-11 PROCEDURE — 77067 SCR MAMMO BI INCL CAD: CPT

## 2025-02-11 PROCEDURE — 77063 BREAST TOMOSYNTHESIS BI: CPT | Performed by: RADIOLOGY

## 2025-02-11 PROCEDURE — 77067 SCR MAMMO BI INCL CAD: CPT | Performed by: RADIOLOGY

## 2025-02-13 DIAGNOSIS — R92.8 ABNORMAL MAMMOGRAM: Primary | ICD-10-CM

## 2025-02-14 DIAGNOSIS — R92.8 ABNORMAL FINDING ON MAMMOGRAPHY: Primary | ICD-10-CM

## 2025-02-17 ENCOUNTER — TELEMEDICINE (OUTPATIENT)
Dept: PRIMARY CARE | Facility: CLINIC | Age: 45
End: 2025-02-17
Payer: COMMERCIAL

## 2025-02-17 ENCOUNTER — HOSPITAL ENCOUNTER (OUTPATIENT)
Dept: RADIOLOGY | Facility: HOSPITAL | Age: 45
Discharge: HOME | End: 2025-02-17
Payer: COMMERCIAL

## 2025-02-17 DIAGNOSIS — M06.4 INFLAMMATORY POLYARTHROPATHY (MULTI): ICD-10-CM

## 2025-02-17 DIAGNOSIS — G47.00 INSOMNIA, UNSPECIFIED TYPE: Primary | ICD-10-CM

## 2025-02-17 DIAGNOSIS — R92.8 ABNORMAL MAMMOGRAM: ICD-10-CM

## 2025-02-17 LAB — NONINV COLON CA DNA+OCC BLD SCRN STL QL: NEGATIVE

## 2025-02-17 PROCEDURE — 76642 ULTRASOUND BREAST LIMITED: CPT | Mod: RT

## 2025-02-17 PROCEDURE — 76642 ULTRASOUND BREAST LIMITED: CPT | Mod: RIGHT SIDE | Performed by: RADIOLOGY

## 2025-02-17 PROCEDURE — 76982 USE 1ST TARGET LESION: CPT | Mod: RT

## 2025-02-17 PROCEDURE — 99213 OFFICE O/P EST LOW 20 MIN: CPT | Performed by: INTERNAL MEDICINE

## 2025-02-17 RX ORDER — TRAZODONE HYDROCHLORIDE 100 MG/1
100 TABLET ORAL NIGHTLY PRN
Qty: 90 TABLET | Refills: 1 | Status: SHIPPED | OUTPATIENT
Start: 2025-02-17 | End: 2026-02-17

## 2025-02-17 RX ORDER — CYCLOBENZAPRINE HCL 10 MG
10 TABLET ORAL 2 TIMES DAILY
Qty: 60 TABLET | Refills: 3 | Status: SHIPPED | OUTPATIENT
Start: 2025-02-17 | End: 2025-06-17

## 2025-02-17 ASSESSMENT — ENCOUNTER SYMPTOMS
NAUSEA: 0
CHILLS: 0
SHORTNESS OF BREATH: 0
ABDOMINAL PAIN: 0
FEVER: 0
VOMITING: 0

## 2025-02-17 NOTE — PROGRESS NOTES
Subjective   Patient ID: Trini aSnchez is a 44 y.o. female who presents for virtual visit.     Spoke with patient. States the Trazadone at 100 mg is working for the insomnia. Also had her follow up breast imaging which has shown chronic subareola abscesses that she has now made an appointment with breast clinic to follow up with. Reports is having difficulty reaching Endo for meds and supplies. No appt until May.          Review of Systems   Constitutional:  Negative for chills and fever.   Respiratory:  Negative for shortness of breath.    Cardiovascular:  Negative for chest pain.   Gastrointestinal:  Negative for abdominal pain, nausea and vomiting.       Objective   There were no vitals taken for this visit.    Physical Exam  Virtual visit  Assessment/Plan   Insomnia: increase dose of Trazadone to 100 mg. Has upcoming appt with Sleep Medicine.   Patient will follow up with breast clinic.  Keep upcoming appt.

## 2025-02-18 DIAGNOSIS — E11.69 TYPE 2 DIABETES MELLITUS WITH OTHER SPECIFIED COMPLICATION, WITHOUT LONG-TERM CURRENT USE OF INSULIN: ICD-10-CM

## 2025-02-18 DIAGNOSIS — E11.69 TYPE 2 DIABETES MELLITUS WITH OTHER SPECIFIED COMPLICATION, UNSPECIFIED WHETHER LONG TERM INSULIN USE (MULTI): ICD-10-CM

## 2025-02-18 RX ORDER — DEXTROSE 4 G
TABLET,CHEWABLE ORAL
Qty: 1 EACH | Refills: 0 | Status: SHIPPED | OUTPATIENT
Start: 2025-02-18

## 2025-02-18 RX ORDER — BLOOD SUGAR DIAGNOSTIC
STRIP MISCELLANEOUS
Qty: 400 STRIP | Refills: 2 | Status: SHIPPED | OUTPATIENT
Start: 2025-02-18

## 2025-02-20 ENCOUNTER — APPOINTMENT (OUTPATIENT)
Dept: PRIMARY CARE | Facility: CLINIC | Age: 45
End: 2025-02-20
Payer: COMMERCIAL

## 2025-02-20 NOTE — PROGRESS NOTES
Trini Sanchez female   1980 44 y.o.   [id number]      Chief Complaint  New patient, right breast abscess     History Of Present Illness  Trini Sanchez is a 44 y.o. female presenting with reoccurring chronic right breast abscess. States the abscess is painful. She is a smoker. She has a past medical history of hidradenitis suppurativa and does not have a dermatologist.  She denies breast biopsy or surgery. She has family history breast cancer in maternal cousin.     BREAST IMAGIN2025 Bilateral screening mammogram, BI-RADS Category 0. Right breast mass, likely recurrent abscess. 2025 Right ultrasound BI-RADS Category 2. Findings suggestive of complex cystic masses in the dermis/areolar region of the right breast, most consistent with recurrent/chronic abscesses. Clinical follow-up after treatment recommended to document interval improvement. Repeat targeted ultrasound in 4-6 weeks recommended. The patient was provided clinical information for breast clinic if she is  not managed by her referring clinician.      REPRODUCTIVE HISTORY: menarche age 12, nilliparous,  OCP's 5-10 years, perimenopausal,  no HRT, scattered breast tissue                                   FAMILY CANCER HISTORY:   Maternal aunt: Colon cancer age 40s  Maternal cousin: Breast cancer age 40     Surgical History  She has a past surgical history that includes Tonsillectomy (2014); Laparoscopy diagnostic / biopsy / aspiration / lysis (2014); and Tubal ligation (10/04/2016).     Social History  She reports that she has been smoking cigarettes. She has a 17 pack-year smoking history. She has never used smokeless tobacco. She reports that she does not currently use alcohol. She reports current drug use. Frequency: 7.00 times per week. Drug: Marijuana.    Family History  Family History   Problem Relation Name Age of Onset    Other (cerebral infarction) Mother      Heart failure Mother      Diabetes Mother       Fibromyalgia Mother      Hypertension Mother      Other (methicillin resistant staphylococcus aureus infection) Mother      Rheum arthritis Mother      Stroke Mother      Arthritis Father      Hypertension Father      Obesity Father      Other (pacemaker placement) Father      Other (uterine leiomyoma) Cousin      Breast cancer Maternal Cousin  40        Allergies  Aspirin and Nsaids (non-steroidal anti-inflammatory drug)    Medications  Current Outpatient Medications   Medication Instructions    albuterol 90 mcg/actuation inhaler 2 puffs, inhalation, Every 4 hours PRN    alcohol swabs pads, medicated USE TO CLEAN SKIN PRIOR TO INSULIN INJECTION OR BLOOD GLUCOSE CHECK 4 TIMES DAILY    allopurinol (ZYLOPRIM) 100 mg, oral, Daily    amLODIPine (NORVASC) 10 mg, oral, Daily    atorvastatin (LIPITOR) 10 mg, oral, Daily    blood-glucose meter misc Use to check blood sugars    calcitriol (ROCALTROL) 0.25 mcg, oral, Every other day    carvedilol (COREG) 25 mg, oral, 2 times daily    chlorthalidone (HYGROTON) 25 mg, Daily    cholecalciferol (VITAMIN D-3) 5,000 Units, Daily    clindamycin (Cleocin T) 1 % lotion APPLY A THIN LAYER TOPICALLY TO THE AFFECTED AREA ONCE IN THE MORING    cyclobenzaprine (FLEXERIL) 10 mg, oral, 2 times daily    doxycycline (VIBRA-TABS) 100 mg, oral, 2 times daily, Take with a full glass of water and do not lie down for at least 30 minutes after.    dulaglutide (TRULICITY) 3 mg, subcutaneous, Once Weekly    DULoxetine (CYMBALTA) 60 mg, oral, Daily, Do not crush or chew.    Farxiga 10 mg, oral, Daily before breakfast    fluticasone (Flonase) 50 mcg/actuation nasal spray 1 spray, Each Nostril, Daily    FreeStyle Jessica 3 [place] misc Use as instructed    FreeStyle Jessica 3 Sensor device CHANGE SENSOR EVERY 14 DAYS AS DIRECTED    furosemide (LASIX) 40 mg, oral, 2 times daily (morning and late afternoon)    glucagon 3 mg/actuation spray,non-aerosol USE 1 SPRAY INTRANASALLY AS NEEDED FOR BLOOD SUGAR <70     "medroxyPROGESTERone (DEPO-PROVERA) 150 mg, intramuscular, Every 3 months    OneTouch Delica Plus Lancet 33 gauge misc USE TO CHECK GLUCOSE 4 TIMES A DAY    OneTouch Ultra Test strip CHECK BLOOD SUGARS 4 TIMES DAILY    pregabalin (Lyrica) 75 mg capsule 1 tablet, Every 12 hours scheduled (0630,1830)    Sure Comfort Pen Needle 32 gauge x 5/32\" needle 1 EACH BEFORE MEALS AND AT BEDTIME    traZODone (DESYREL) 100 mg, oral, Nightly PRN    valsartan (DIOVAN) 320 mg, oral, Daily    varenicline tartrate (CHANTIX) 1 mg, oral, 2 times daily, Take with full glass of water.         REVIEW OF SYSTEMS    Constitutional:  Negative for appetite change, fatigue, fever and unexpected weight change.   HENT:  Negative for ear pain, hearing loss, nosebleeds, sore throat and trouble swallowing.    Eyes:  Negative for discharge, itching and visual disturbance.   Respiratory:  Negative for cough, chest tightness and shortness of breath.    Cardiovascular:  Negative for chest pain, palpitations and leg swelling.   Breast: as indicated in HPI  Gastrointestinal:  Negative for abdominal pain, constipation, diarrhea and nausea.   Endocrine: Negative for cold intolerance and heat intolerance.   Genitourinary:  Negative for dysuria, frequency, hematuria, pelvic pain and vaginal bleeding.   Musculoskeletal:  Negative for arthralgias, back pain, gait problem, joint swelling and myalgias.   Skin:  Negative for color change and rash.   Allergic/Immunologic: Negative for environmental allergies and food allergies.   Neurological:  Negative for dizziness, tremors, speech difficulty, weakness, numbness and headaches.   Hematological:  Does not bruise/bleed easily.   Psychiatric/Behavioral:  Negative for agitation, dysphoric mood and sleep disturbance. The patient is not nervous/anxious.         Past Medical History  She has a past medical history of Adjustment disorder with depressed mood (01/02/2019), Encounter for other preprocedural examination " (03/12/2018), Furuncle of limb, unspecified (08/24/2018), Pain in right finger(s) (04/03/2017), and Personal history of diseases of the skin and subcutaneous tissue (05/24/2016).    She has no past medical history of Personal history of irradiation.     Physical Exam  Patient is alert and oriented x3 and in a relaxed and appropriate mood. Her gait is steady and hand grasps are equal. Sclera is clear. The breasts are nearly symmetrical. Right breast, subareolar soft mass 3 x 3 cm with pain elicited on exam.  The tissue is soft without palpable abnormalities, discrete nodules or masses. The skin is normal and left nipple appear normal. Bilateral axillary tunneling with no erythema or inflammation. There is no cervical, supraclavicular or axillary lymphadenopathy. Heart rate and rhythm normal, S1 and S2 appreciated. The lungs are clear to auscultation bilaterally. Abdomen is soft and non-tender.       Physical Exam     Last Recorded Vitals  Vitals:    02/21/25 1125   BP: (!) 179/121   Pulse:    Resp:    Temp:    SpO2:      Patient asymptomatic - denies HA, blurred vision, SOB, chest pain. States she has not taken her BP medications yet.     Relevant Results   Time was spent viewing digital images of the radiology testing with the patient. I explained the results in depth, along with suggested explanation for follow up recommendations based on the testing results. BI-RADS Category 2    Imaging  BI mammo bilateral screening tomosynthesis 02/11/2025    Narrative  Interpreted By:  Eunice Wing,  STUDY:  BI MAMMO BILATERAL SCREENING TOMOSYNTHESIS;  2/11/2025 11:43 am    ACCESSION NUMBER(S):  [id number]    ORDERING CLINICIAN:  ANUEL MURRAY    INDICATION:  Screening. History of a right breast abscess.    ,Z12.31 Encounter for screening mammogram for malignant neoplasm of  breast    COMPARISON:  07/30/2013, 09/25/2015, 03/01/2016, 08/24/2018, 12/31/2020, 12/30/2022    FINDINGS:  2D and tomosynthesis images were  reviewed at 1 mm slice thickness.    Density:  There are scattered areas of fibroglandular density.    Breast density has increased and breast thickness has decreased.  Correlate clinically for interval weight loss. There is a mass in the  subareolar right breast. There is periareolar thickening on the  right. This favors a recurrent abscess given the patient's history.  No suspicious masses or calcifications are identified in the left  breast.    Impression  1. No mammographic evidence of malignancy in the left breast.  2. Right breast mass, likely recurrent absence. Targeted ultrasound  is recommended.  3. Clinical correlation for interval weight loss is recommended.    BI-RADS CATEGORY:  BI-RADS Category:  0 Incomplete; Need Additional Imaging Evaluation  Recommendation:  Additional Imaging.  Recommended Date:  Immediate.  Laterality:  Right.      Interpreted By:  Roya Calhoun and Afshari Mirak Sohrab   STUDY:  BI US BREAST LIMITED RIGHT;  2/17/2025 3:32 pm      ACCESSION NUMBER(S):  [id number]      ORDERING CLINICIAN:  ANUEL MURRAY      INDICATION:  Right breast mass in the subareolar region visualized on the most  recent screening mammogram on 02/11/2025. The patient states she is  currently asymptomatic. History of recurrent abscesses and  hidradenitis. CT chest 05/26/2023.      ,R92.8 Other abnormal and inconclusive findings on diagnostic imaging  of breast      COMPARISON:  Prior breast ultrasounds most recent 12/31/2020. screening mammogram  most recent 02/11/2025.      FINDINGS:  Targeted ultrasound was performed of the right by a registered  sonographer with elastography.      Corresponding to the subareolar mass visualized on the most recent  screening mammogram on 02/11/2025, there is a large oval  circumscribed parallel heterogeneously hypoechoic mass with posterior  acoustic enhancement measuring approximately 2.8 x 1.5 x 3.8 cm in  the subcutaneous tissue of the subareolar region. An adjacent  oval  circumscribed parallel heterogeneously hypoechoic mass with posterior  acoustic enhancement measures 1.9 x 1.6 x 0.9 cm. There is an  additional adjacent oval circumscribed parallel hypoechoic mass with  internal debris is measuring 1 x 0.4 x 0.7 cm. The masses are soft on  elastography. There has been masses with similar characteristics on  the prior ultrasounds such as ultrasound on 08/20/2018 and are most  consistent with recurrent/chronic abscesses.      All the masses or confined to the dermal layers of the areola and  skin. The underlying breast tissue is unremarkable.      IMPRESSION:  Findings suggestive of complex cystic masses in the dermis/areolar  region of the right breast, most consistent with recurrent/chronic  abscesses.      Clinical follow-up after treatment recommended to document interval  improvement. Repeat targeted ultrasound in 4-6 weeks recommended. The  patient was provided clinical information for breast clinic if she is  not managed by her referring clinician.      BI-RADS CATEGORY:  BI-RADS Category:  2 Benign.  Recommendation:  Clinical follow-up.  Recommended Date:  Immediate.  Laterality:  Right.        Assessment/Plan   Right breast reoccurring right breast abscess. Doxycycline prescribed. Discussed pain relief measures with warm compress multiple times a day. Right breast ultrasound ordered for 6 weeks. Follow up in 2 weeks.    Patient Discussion/Summary  Please start Doxycycline 100 mg twice a day for 14 days. Apply warm compresses multiple times a day for pain relief. Follow up in 2 weeks. Referral placed to Dermatology.    You can see your health information, review clinical summaries from office visits & test results online when you follow your health with MY  Chart, a personal health record. To sign up go to [email or web address]. If you need assistance with signing up or trouble getting into your account call [employer] Patient Line 24/7 at [phone].    My  office phone number is [phone] if you need to get in touch with me or have additional questions or concerns. Thank you for choosing [facility] and trusting me as your healthcare provider. I look forward to seeing you again at your next office visit. I am honored to be a provider on your health care team and I remain dedicated to helping you achieve your health goals.      Samantha Nettles, APRN-CNP

## 2025-02-21 ENCOUNTER — OFFICE VISIT (OUTPATIENT)
Dept: SURGICAL ONCOLOGY | Facility: HOSPITAL | Age: 45
End: 2025-02-21
Payer: COMMERCIAL

## 2025-02-21 VITALS
TEMPERATURE: 97.7 F | BODY MASS INDEX: 40.04 KG/M2 | DIASTOLIC BLOOD PRESSURE: 121 MMHG | HEART RATE: 110 BPM | WEIGHT: 249.12 LBS | OXYGEN SATURATION: 97 % | SYSTOLIC BLOOD PRESSURE: 179 MMHG | RESPIRATION RATE: 18 BRPM | HEIGHT: 66 IN

## 2025-02-21 DIAGNOSIS — L73.2 HIDRADENITIS SUPPURATIVA: ICD-10-CM

## 2025-02-21 DIAGNOSIS — N61.1 ABSCESS OF RIGHT BREAST: Primary | ICD-10-CM

## 2025-02-21 PROCEDURE — 3008F BODY MASS INDEX DOCD: CPT

## 2025-02-21 PROCEDURE — 3077F SYST BP >= 140 MM HG: CPT

## 2025-02-21 PROCEDURE — 99203 OFFICE O/P NEW LOW 30 MIN: CPT

## 2025-02-21 PROCEDURE — 99214 OFFICE O/P EST MOD 30 MIN: CPT

## 2025-02-21 PROCEDURE — 99213 OFFICE O/P EST LOW 20 MIN: CPT

## 2025-02-21 PROCEDURE — 3080F DIAST BP >= 90 MM HG: CPT

## 2025-02-21 RX ORDER — DOXYCYCLINE HYCLATE 100 MG
100 TABLET ORAL 2 TIMES DAILY
Qty: 28 TABLET | Refills: 0 | Status: SHIPPED | OUTPATIENT
Start: 2025-02-21 | End: 2025-03-07

## 2025-02-21 ASSESSMENT — PATIENT HEALTH QUESTIONNAIRE - PHQ9
2. FEELING DOWN, DEPRESSED OR HOPELESS: NOT AT ALL
SUM OF ALL RESPONSES TO PHQ9 QUESTIONS 1 AND 2: 0
1. LITTLE INTEREST OR PLEASURE IN DOING THINGS: NOT AT ALL

## 2025-02-21 ASSESSMENT — ENCOUNTER SYMPTOMS
DEPRESSION: 0
OCCASIONAL FEELINGS OF UNSTEADINESS: 0
LOSS OF SENSATION IN FEET: 0

## 2025-02-21 ASSESSMENT — COLUMBIA-SUICIDE SEVERITY RATING SCALE - C-SSRS
6. HAVE YOU EVER DONE ANYTHING, STARTED TO DO ANYTHING, OR PREPARED TO DO ANYTHING TO END YOUR LIFE?: NO
2. HAVE YOU ACTUALLY HAD ANY THOUGHTS OF KILLING YOURSELF?: NO
1. IN THE PAST MONTH, HAVE YOU WISHED YOU WERE DEAD OR WISHED YOU COULD GO TO SLEEP AND NOT WAKE UP?: NO

## 2025-02-21 ASSESSMENT — PAIN SCALES - GENERAL: PAINLEVEL_OUTOF10: 0-NO PAIN

## 2025-02-21 NOTE — PATIENT INSTRUCTIONS
Please start Doxycycline 100 mg twice a day for 14 days. Apply warm compresses multiple times a day for pain relief. Follow up in 2 weeks. Referral placed to Dermatology.    You can see your health information, review clinical summaries from office visits & test results online when you follow your health with MY  Chart, a personal health record. To sign up go to [email or web address]. If you need assistance with signing up or trouble getting into your account call [employer] Patient Line 24/7 at [phone].    My office phone number is [phone] if you need to get in touch with me or have additional questions or concerns. Thank you for choosing [facility] and trusting me as your healthcare provider. I look forward to seeing you again at your next office visit. I am honored to be a provider on your health care team and I remain dedicated to helping you achieve your health goals.

## 2025-03-04 NOTE — PROGRESS NOTES
Trini Sanchez female   1980 44 y.o.   [id number]      Chief Complaint  New patient, right breast abscess     History Of Present Illness  Trini Sanchez is a 44 y.o. female presenting with reoccurring chronic right breast abscess. States the abscess is painful. She is a smoker. She has a past medical history of hidradenitis suppurativa and does not have a dermatologist.  She denies breast biopsy or surgery. She has family history breast cancer in maternal cousin.     BREAST IMAGIN2025 Bilateral screening mammogram, BI-RADS Category 0. Right breast mass, likely recurrent abscess. 2025 Right ultrasound BI-RADS Category 2. Findings suggestive of complex cystic masses in the dermis/areolar region of the right breast, most consistent with recurrent/chronic abscesses. Clinical follow-up after treatment recommended to document interval improvement. Repeat targeted ultrasound in 4-6 weeks recommended. The patient was provided clinical information for breast clinic if she is not managed by her referring clinician.      REPRODUCTIVE HISTORY: menarche age 12, nilliparous,  OCP's 5-10 years, perimenopausal,  no HRT, scattered breast tissue                                   FAMILY CANCER HISTORY:   Maternal aunt: Colon cancer age 40s  Maternal cousin: Breast cancer age 40     Surgical History  She has a past surgical history that includes Tonsillectomy (2014); Laparoscopy diagnostic / biopsy / aspiration / lysis (2014); and Tubal ligation (10/04/2016).     Social History  She reports that she has been smoking cigarettes. She has a 17 pack-year smoking history. She has never used smokeless tobacco. She reports that she does not currently use alcohol. She reports current drug use. Frequency: 7.00 times per week. Drug: Marijuana.    Family History  Family History   Problem Relation Name Age of Onset    Other (cerebral infarction) Mother      Heart failure Mother      Diabetes Mother      Fibromyalgia  Mother      Hypertension Mother      Other (methicillin resistant staphylococcus aureus infection) Mother      Rheum arthritis Mother      Stroke Mother      Arthritis Father      Hypertension Father      Obesity Father      Other (pacemaker placement) Father      Other (uterine leiomyoma) Cousin      Breast cancer Maternal Cousin  40        Allergies  Aspirin and Nsaids (non-steroidal anti-inflammatory drug)    Medications  Current Outpatient Medications   Medication Instructions    albuterol 90 mcg/actuation inhaler 2 puffs, inhalation, Every 4 hours PRN    alcohol swabs pads, medicated USE TO CLEAN SKIN PRIOR TO INSULIN INJECTION OR BLOOD GLUCOSE CHECK 4 TIMES DAILY    allopurinol (ZYLOPRIM) 100 mg, oral, Daily    amLODIPine (NORVASC) 10 mg, oral, Daily    atorvastatin (LIPITOR) 10 mg, oral, Daily    blood-glucose meter misc Use to check blood sugars    calcitriol (ROCALTROL) 0.25 mcg, oral, Every other day    carvedilol (COREG) 25 mg, oral, 2 times daily    chlorthalidone (HYGROTON) 25 mg, Daily    cholecalciferol (VITAMIN D-3) 5,000 Units, Daily    clindamycin (Cleocin T) 1 % lotion APPLY A THIN LAYER TOPICALLY TO THE AFFECTED AREA ONCE IN THE MORING    cyclobenzaprine (FLEXERIL) 10 mg, oral, 2 times daily    doxycycline (VIBRA-TABS) 100 mg, oral, 2 times daily, Take with a full glass of water and do not lie down for at least 30 minutes after.    dulaglutide (TRULICITY) 3 mg, subcutaneous, Once Weekly    DULoxetine (CYMBALTA) 60 mg, oral, Daily, Do not crush or chew.    Farxiga 10 mg, oral, Daily before breakfast    fluticasone (Flonase) 50 mcg/actuation nasal spray 1 spray, Each Nostril, Daily    FreeStyle Jessica 3 [place] misc Use as instructed    FreeStyle Jessica 3 Sensor device CHANGE SENSOR EVERY 14 DAYS AS DIRECTED    furosemide (LASIX) 40 mg, oral, 2 times daily (morning and late afternoon)    glucagon 3 mg/actuation spray,non-aerosol USE 1 SPRAY INTRANASALLY AS NEEDED FOR BLOOD SUGAR <70     "medroxyPROGESTERone (DEPO-PROVERA) 150 mg, intramuscular, Every 3 months    OneTouch Delica Plus Lancet 33 gauge misc USE TO CHECK GLUCOSE 4 TIMES A DAY    OneTouch Ultra Test strip CHECK BLOOD SUGARS 4 TIMES DAILY    pregabalin (Lyrica) 75 mg capsule 1 tablet, Every 12 hours scheduled (0630,1830)    Sure Comfort Pen Needle 32 gauge x 5/32\" needle 1 EACH BEFORE MEALS AND AT BEDTIME    traZODone (DESYREL) 100 mg, oral, Nightly PRN    valsartan (DIOVAN) 320 mg, oral, Daily    varenicline tartrate (CHANTIX) 1 mg, oral, 2 times daily, Take with full glass of water.         REVIEW OF SYSTEMS    Constitutional:  Negative for appetite change, fatigue, fever and unexpected weight change.   HENT:  Negative for ear pain, hearing loss, nosebleeds, sore throat and trouble swallowing.    Eyes:  Negative for discharge, itching and visual disturbance.   Respiratory:  Negative for cough, chest tightness and shortness of breath.    Cardiovascular:  Negative for chest pain, palpitations and leg swelling.   Breast: as indicated in HPI  Gastrointestinal:  Negative for abdominal pain, constipation, diarrhea and nausea.   Endocrine: Negative for cold intolerance and heat intolerance.   Genitourinary:  Negative for dysuria, frequency, hematuria, pelvic pain and vaginal bleeding.   Musculoskeletal:  Negative for arthralgias, back pain, gait problem, joint swelling and myalgias.   Skin:  Negative for color change and rash.   Allergic/Immunologic: Negative for environmental allergies and food allergies.   Neurological:  Negative for dizziness, tremors, speech difficulty, weakness, numbness and headaches.   Hematological:  Does not bruise/bleed easily.   Psychiatric/Behavioral:  Negative for agitation, dysphoric mood and sleep disturbance. The patient is not nervous/anxious.         Past Medical History  She has a past medical history of Adjustment disorder with depressed mood (01/02/2019), Encounter for other preprocedural examination " (03/12/2018), Furuncle of limb, unspecified (08/24/2018), Pain in right finger(s) (04/03/2017), and Personal history of diseases of the skin and subcutaneous tissue (05/24/2016).    She has no past medical history of Personal history of irradiation.     Physical Exam  Patient is alert and oriented x3 and in a relaxed and appropriate mood. Her gait is steady and hand grasps are equal. Sclera is clear. The breasts are nearly symmetrical. Right breast, subareolar soft mass 3 x 3 cm with pain elicited on exam.  The tissue is soft without palpable abnormalities, discrete nodules or masses. The skin is normal and left nipple appear normal. Bilateral axillary tunneling with no erythema or inflammation. There is no cervical, supraclavicular or axillary lymphadenopathy. Heart rate and rhythm normal, S1 and S2 appreciated. The lungs are clear to auscultation bilaterally. Abdomen is soft and non-tender.       Physical Exam     Last Recorded Vitals  There were no vitals filed for this visit.    Patient asymptomatic - denies HA, blurred vision, SOB, chest pain. States she has not taken her BP medications yet.     Relevant Results   Time was spent viewing digital images of the radiology testing with the patient. I explained the results in depth, along with suggested explanation for follow up recommendations based on the testing results. BI-RADS Category 2    Imaging  BI mammo bilateral screening tomosynthesis 02/11/2025    Narrative  Interpreted By:  Eunice Wing,  STUDY:  BI MAMMO BILATERAL SCREENING TOMOSYNTHESIS;  2/11/2025 11:43 am    ACCESSION NUMBER(S):  [id number]    ORDERING CLINICIAN:  ANUEL MURRAY    INDICATION:  Screening. History of a right breast abscess.    ,Z12.31 Encounter for screening mammogram for malignant neoplasm of  breast    COMPARISON:  07/30/2013, 09/25/2015, 03/01/2016, 08/24/2018, 12/31/2020, 12/30/2022    FINDINGS:  2D and tomosynthesis images were reviewed at 1 mm slice  thickness.    Density:  There are scattered areas of fibroglandular density.    Breast density has increased and breast thickness has decreased.  Correlate clinically for interval weight loss. There is a mass in the  subareolar right breast. There is periareolar thickening on the  right. This favors a recurrent abscess given the patient's history.  No suspicious masses or calcifications are identified in the left  breast.    Impression  1. No mammographic evidence of malignancy in the left breast.  2. Right breast mass, likely recurrent absence. Targeted ultrasound  is recommended.  3. Clinical correlation for interval weight loss is recommended.    BI-RADS CATEGORY:  BI-RADS Category:  0 Incomplete; Need Additional Imaging Evaluation  Recommendation:  Additional Imaging.  Recommended Date:  Immediate.  Laterality:  Right.      Interpreted By:  Roya Calhoun and Afshari Mirak Sohrab   STUDY:  BI US BREAST LIMITED RIGHT;  2/17/2025 3:32 pm      ACCESSION NUMBER(S):  [id number]      ORDERING CLINICIAN:  ANUEL MURRAY      INDICATION:  Right breast mass in the subareolar region visualized on the most  recent screening mammogram on 02/11/2025. The patient states she is  currently asymptomatic. History of recurrent abscesses and  hidradenitis. CT chest 05/26/2023.      ,R92.8 Other abnormal and inconclusive findings on diagnostic imaging  of breast      COMPARISON:  Prior breast ultrasounds most recent 12/31/2020. screening mammogram  most recent 02/11/2025.      FINDINGS:  Targeted ultrasound was performed of the right by a registered  sonographer with elastography.      Corresponding to the subareolar mass visualized on the most recent  screening mammogram on 02/11/2025, there is a large oval  circumscribed parallel heterogeneously hypoechoic mass with posterior  acoustic enhancement measuring approximately 2.8 x 1.5 x 3.8 cm in  the subcutaneous tissue of the subareolar region. An adjacent oval  circumscribed  parallel heterogeneously hypoechoic mass with posterior  acoustic enhancement measures 1.9 x 1.6 x 0.9 cm. There is an  additional adjacent oval circumscribed parallel hypoechoic mass with  internal debris is measuring 1 x 0.4 x 0.7 cm. The masses are soft on  elastography. There has been masses with similar characteristics on  the prior ultrasounds such as ultrasound on 08/20/2018 and are most  consistent with recurrent/chronic abscesses.      All the masses or confined to the dermal layers of the areola and  skin. The underlying breast tissue is unremarkable.      IMPRESSION:  Findings suggestive of complex cystic masses in the dermis/areolar  region of the right breast, most consistent with recurrent/chronic  abscesses.      Clinical follow-up after treatment recommended to document interval  improvement. Repeat targeted ultrasound in 4-6 weeks recommended. The  patient was provided clinical information for breast clinic if she is  not managed by her referring clinician.      BI-RADS CATEGORY:  BI-RADS Category:  2 Benign.  Recommendation:  Clinical follow-up.  Recommended Date:  Immediate.  Laterality:  Right.        Assessment/Plan   Right breast reoccurring right breast abscess. Doxycycline prescribed. Discussed pain relief measures with warm compress multiple times a day. Right breast ultrasound ordered for 6 weeks. Follow up in 2 weeks.    Patient Discussion/Summary  Please start Doxycycline 100 mg twice a day for 14 days. Apply warm compresses multiple times a day for pain relief. Follow up in 2 weeks. Referral placed to Dermatology.    You can see your health information, review clinical summaries from office visits & test results online when you follow your health with MY  Chart, a personal health record. To sign up go to [email or web address]. If you need assistance with signing up or trouble getting into your account call [facility] Patient Line 24/7 at [phone].    My office phone number is  [phone] if you need to get in touch with me or have additional questions or concerns. Thank you for choosing [facility] and trusting me as your healthcare provider. I look forward to seeing you again at your next office visit. I am honored to be a provider on your health care team and I remain dedicated to helping you achieve your health goals.      Samantha Nettles, APRN-CNP

## 2025-03-07 ENCOUNTER — APPOINTMENT (OUTPATIENT)
Dept: SURGICAL ONCOLOGY | Facility: HOSPITAL | Age: 45
End: 2025-03-07
Payer: COMMERCIAL

## 2025-03-16 DIAGNOSIS — E11.69 TYPE 2 DIABETES MELLITUS WITH OTHER SPECIFIED COMPLICATION, WITHOUT LONG-TERM CURRENT USE OF INSULIN: ICD-10-CM

## 2025-03-17 RX ORDER — BLOOD-GLUCOSE SENSOR
EACH MISCELLANEOUS
Qty: 6 EACH | Refills: 1 | Status: SHIPPED | OUTPATIENT
Start: 2025-03-17

## 2025-03-17 NOTE — PROGRESS NOTES
"      Mihaela Rice, ANN, [facility]  Division of Adult Reconstruction  Phone: [phone]  Fax: [phone]    PRIMARY CARE PHYSICIAN: Alia Walker MD  REFERRING PROVIDER: No referring provider defined for this encounter.         MILA Sanchez is a pleasant 44 y.o. year-old female who is seen today for evaluation of {kwside:80035} knee pain. The pain has been present for {Number:94519}{DAYS, WEEKS, MONTHS, YEARS:22024}. {HE/SHE:50801} rates the pain a {PAIN SCALE NUMBERS:27356} out of 10. They {DO/DO NOT:43591} associate with a traumatic injury. The patient states that the pain is located in the {MEDIAL/LAT/ANT/POST DETAILED:13594}. The pain is aggravated by {KWAGGRAVATING FACTOR:74026}. The patient has tried {WCL Non Op Treatments:10534::\"over the counter medications\"} without sufficient relief of symptoms. The patient denies any history of inflammatory arthritis. The patient denies any numbness or tingling of the {side:35887} lower extremity.      History of knee surgery: {total joint:86177}  Functional status: occasionally limited   Instability: {YES,NO:63951}  Symptoms impacting sleep: {YES,NO:49518}  Impacting quality of life: {YES,NO:17026}  Pain level: {PAIN SCALE NUMBERS:62629}  Back pain: {YES (DEF)/NO:65610}  Hip/groin pain: {YES (DEF)/NO:44485}  Radicular symptoms: {kwside:81189}    PMHx includes a smoking hx 17 pack year hx on Chantix, DM2 last A1C 6.8% on 9/18/24, HTN, chronic diastolic HF, hx of DVT, dental abscess, stage 3 CKD, hidradentis suppurativa, hx of recurrent breast abscess, hx of foot ulcer.      Review of systems  There has been no interval change in this patient's past medical, surgical, medications, allergies, family history or social history since the most recent visit to a provider within our department. Adult patient history sheet was filled out by the patient today in clinic and will be scanned into the EMR. I personally reviewed this form which will be scanned into the " EMR. 14 point review of systems was performed, reviewed, and negative except for pertinent positives documented in the history of present illness.    Past Medical History:   Diagnosis Date    Adjustment disorder with depressed mood 01/02/2019    Dysfunctional grieving    Encounter for other preprocedural examination 03/12/2018    Pre-op testing    Furuncle of limb, unspecified 08/24/2018    Boil, axilla    Pain in right finger(s) 04/03/2017    Pain of right thumb    Personal history of diseases of the skin and subcutaneous tissue 05/24/2016    History of cellulitis     Patient Active Problem List   Diagnosis    Abnormal finding on mammography    Abnormal uterine bleeding    Altered glucose metabolism    Hyperglycemia    Anxiety    Astigmatism    Breast lump    Breast mass, right    CHF (congestive heart failure)    Chronic pelvic pain in female    Deep vein thrombosis (DVT) of popliteal vein of right lower extremity (Multi)    Depression with anxiety    Elevated serum creatinine    Endometriosis    Fatty liver    Hidradenitis    Hyperlipidemia    Hypertension    Insomnia    Localized primary osteoarthritis of right lower leg    Obesity, Class II, BMI 35-39.9    MIRA (obstructive sleep apnea)    Osteoarthritis    Pain of left thumb    Paresthesia of both hands    Renal failure    SOBOE (shortness of breath on exertion)    Stage III chronic kidney disease (Multi)    Trigger thumb of both thumbs    Inflammatory polyarthropathy (Multi)    Vitamin D deficiency    Allergic conjunctivitis    Acute cystitis without hematuria    Tobacco dependence syndrome    Streptococcal pneumonia (CMS-HCC)    Sinusitis    Pruritus of vagina    Pneumonia due to infectious organism    Pain in wrist    Leukocytosis    Knee pain    Influenza    Hypoxia    Hypokalemia    Gout    Exposure to severe acute respiratory syndrome coronavirus 2 (SARS-CoV-2)    Edema of lower extremity    Dental abscess    Cramping of hands    Cubital tunnel syndrome  on right    Degeneration of intervertebral disc of lumbar region    Chronic diastolic CHF (congestive heart failure)    Edema of both lower extremities    Urinary tract infection without hematuria     Medication Documentation Review Audit       Reviewed by DION Tejada (Nurse Practitioner) on 02/21/25 at 1205      Medication Order Taking? Sig Documenting Provider Last Dose Status   albuterol 90 mcg/actuation inhaler [id number] Yes Inhale 2 puffs every 4 hours if needed for wheezing. Sary Gonzales MD Taking Active   alcohol swabs pads, medicated [id number] Yes USE TO CLEAN SKIN PRIOR TO INSULIN INJECTION OR BLOOD GLUCOSE CHECK 4 TIMES DAILY Sary Gonzales MD  Active   allopurinol (Zyloprim) 100 mg tablet [id number] Yes TAKE 1 TABLET BY MOUTH EVERY DAY Lucho Abdul MD  Active   amLODIPine (Norvasc) 10 mg tablet [id number] Yes Take 1 tablet (10 mg) by mouth once daily. DION Kramer  Active   atorvastatin (Lipitor) 10 mg tablet [id number] Yes Take 1 tablet (10 mg) by mouth once daily. DION Stewart  Active   blood-glucose meter misc [id number] Yes Use to check blood sugars DION Stewart  Active   calcitriol (Rocaltrol) 0.25 mcg capsule [id number] Yes Take 1 capsule (0.25 mcg) by mouth every other day. Sary Gonzales MD Taking Active   carvedilol (Coreg) 25 mg tablet [id number] Yes Take 1 tablet (25 mg) by mouth 2 times a day. Sary Gonzales MD Taking Active   chlorthalidone (Hygroton) 25 mg tablet [id number] Yes Take 1 tablet (25 mg) by mouth once daily. Beronica Dominguez MD Taking Active   cholecalciferol (Vitamin D-3) 125 MCG (5000 UT) capsule [id number] No Take 1 capsule (125 mcg) by mouth once daily.   Patient not taking: Reported on 12/10/2024    Historical Provider, MD Taking Active   clindamycin (Cleocin T) 1 % lotion [id number] Yes APPLY A THIN LAYER TOPICALLY TO THE AFFECTED AREA ONCE IN THE MORING Historical Provider, MD  Active     Discontinued 02/17/25  1728   cyclobenzaprine (Flexeril) 10 mg tablet [id number] Yes Take 1 tablet (10 mg) by mouth 2 times a day. Alia Walker MD  Active   doxycycline (Vibra-Tabs) 100 mg tablet [id number]  Take 1 tablet (100 mg) by mouth 2 times a day for 14 days. Take with a full glass of water and do not lie down for at least 30 minutes after. DION Tejada  Active   dulaglutide (Trulicity) 3 mg/0.5 mL injection [id number] Yes Inject 3 mg under the skin 1 (one) time per week. DION Stewart  Active   Discontinued 25 08   DULoxetine (Cymbalta) 60 mg DR capsule [id number] Yes Take 1 capsule (60 mg) by mouth once daily. Do not crush or chew. Alia Walker MD  Active   Farxiga 10 mg [id number] Yes TAKE 1 TABLET BY MOUTH EVERY DAY IN THE MORNING Lucho Abdul MD Taking Active   fluticasone (Flonase) 50 mcg/actuation nasal spray [id number] No Administer 1 spray into each nostril once daily. Mini Neal MD Taking  12/10/24 235   FreeStyle Jessica 3 [place] misc [id number] Yes Use as instructed DION Stewart  Active   FreeStyle Jessica 3 Sensor device [id number] Yes CHANGE SENSOR EVERY 14 DAYS AS DIRECTED DION Stewart  Active   furosemide (Lasix) 40 mg tablet [id number] Yes Take 1 tablet (40 mg) by mouth 2 times daily (morning and late afternoon). Talat Martin MD  Active   glucagon 3 mg/actuation spray,non-aerosol [id number] No USE 1 SPRAY INTRANASALLY AS NEEDED FOR BLOOD SUGAR <70 Toña Clark MD Taking  12/10/24 2359   medroxyPROGESTERone 150 mg/mL injection [id number] Yes Inject 1 mL (150 mg) into the muscle every 3 months. Patricia Baeza MD  Active   OneTouch Delica Plus Lancet 33 gauge misc [id number] Yes USE TO CHECK GLUCOSE 4 TIMES A DAY Beronica Dominguez MD Taking Active     Discontinued 25 08   OneTouch Ultra Test strip [id number] Yes CHECK BLOOD SUGARS 4 TIMES DAILY Vannessa Rodriguez, APRN-CNP  Active   pregabalin (Lyrica) 75 mg capsule  "[id number] Yes Take 1 tablet by mouth every 12 hours.   Patient taking differently: Take 2 tablets by mouth every 12 hours.    Historical Provider, MD  Active   Sure Comfort Pen Needle 32 gauge x 5/32\" needle [id number] Yes 1 EACH BEFORE MEALS AND AT BEDTIME Historical Provider, MD  Active   traZODone (Desyrel) 100 mg tablet [id number] Yes Take 1 tablet (100 mg) by mouth as needed at bedtime for sleep. Alia Walker MD  Active     Discontinued 02/17/25 1719   valsartan (Diovan) 320 mg tablet [id number] Yes Take 1 tablet (320 mg) by mouth once daily. Lucho Abdul MD  Active   varenicline tartrate (Chantix) 1 mg tablet [id number] Yes Take 1 tablet (1 mg) by mouth 2 times a day. Take with full glass of water. Alia Walker MD  Active                  Allergies   Allergen Reactions    Aspirin Itching    Nsaids (Non-Steroidal Anti-Inflammatory Drug) Unknown     Social History     Socioeconomic History    Marital status: Single     Spouse name: Not on file    Number of children: Not on file    Years of education: Not on file    Highest education level: Not on file   Occupational History    Not on file   Tobacco Use    Smoking status: Every Day     Current packs/day: 1.00     Average packs/day: 1 pack/day for 17.0 years (17.0 ttl pk-yrs)     Types: Cigarettes    Smokeless tobacco: Never   Substance and Sexual Activity    Alcohol use: Not Currently    Drug use: Yes     Frequency: 7.0 times per week     Types: Marijuana    Sexual activity: Not on file   Other Topics Concern    Not on file   Social History Narrative    Not on file     Social Drivers of Health     Financial Resource Strain: Not on file   Food Insecurity: No Food Insecurity (12/30/2024)    Hunger Vital Sign     Worried About Running Out of Food in the Last Year: Never true     Ran Out of Food in the Last Year: Never true   Transportation Needs: Not on file   Physical Activity: Not on file   Stress: Not on file   Social Connections: Not on file   Intimate " Partner Violence: Not on file   Housing Stability: Not on file     Past Surgical History:   Procedure Laterality Date    LAPAROSCOPY DIAGNOSTIC / BIOPSY / ASPIRATION / LYSIS  03/18/2014    Exploratory Laparoscopy    TONSILLECTOMY  03/18/2014    Tonsillectomy With Adenoidectomy    TUBAL LIGATION  10/04/2016    Tubal Ligation         Physical Exam  General: Well-appearing female in no acute distress. Awake, alert and oriented. Pleasant and cooperative.  Respiratory: Non-labored breathing  Mood: Euthymic   Gait: {kwgait:01743}  Assistive Device: {amb assistive device:23139}  Skin: warm, dry and intact without lesions    Affected {side:55535} knee:  Tenderness to palpation over the {MEDIAL/LAT/ANT/POST DETAILED:02369} joint line.  Effusion: {severity:01206}  ROM: {kwkneeextension:23916} - {degrees of flexion to 180:30287}  Limb Alignment: {Neutral/varus:93568}  Stable to varus and valgus stress at full extension and 30 degrees of flexion  Quad Strength: 5/5  Hamstring Strength: 5/5  Patella Crepitus: {YES,NO:53512}  Patella Grind Test: {YES,NO:50973}  Sensation: Intact to light touch distally  Motor function: Able to fire TA, EHL, G/S  Pulses: Palpable DP pulse    Imaging  Imaging-4 view xrays of the {Right/left:34843} knee were independently reviewed and interpreted in clinic today. The findings were reviewed with the patient. There are {AMB MILD/MODERATE/SEVERE:43571} degenerative changes of the {Right/left:78588} knee with {VARUS:44561} limb alignment. There is {kwxrhip/knee:66059} {medial/lateral:35739} joint space narrowing with underlying subchondral sclerosis, and osteophyte formation. No evidence of fracture, AVN, dislocation, osteomyelitis.         Impression/Plan  Trini Sanchez is a 44 y.o. year-old female with {MILD, MODERATE, SEVERE:23708} OA of the {side:92529} knee. We reviewed an evidence-based approach to osteoarthritis of the knee. We discussed in detail a treatment plan that {HE/SHE:16508} is  comfortable with.    I had an in-depth discussion about the nature and natural history of degenerative joint disease. I discussed the etiology of symptoms. I explained that it is progressive, but there is no way to predict how quickly a patient's symptoms will get worse. I discussed non-operative treatments for the patient's osteoarthritis in detail and briefly discussed indications for surgery. I advised that the patient that they can manage their pain symptomatically, with 3-5 day courses of nonsteroidal anti-inflammatories discussed their risks and need for regular monitoring for side effects of these medications. I suggested activity modification as needed when there is a a flare up. I explained to them that the best interventions {he/she/they:02190} can take to perhaps slow the progression of the degenerative changes in the long-term are maintaining a healthy weight and avoiding joint loading activities and perform low impact exercise such as cycling, walking, and swimming. The use of a walking stick, cane or other assistive device can help as well.      I also discussed more invasive treatment options including injections and radiofrequency nerve ablation. I talked about the risks and benefits of injections, focusing on the fact that there is no way to predict the degree or duration of symptom relief, but that it can provide weeks to months of pain relief in most patients. Should these measures fail, the most invasive option would be joint replacement surgery. The patient should try and delay joint replacement surgery for as long as possible. If the patients' joint problem affects their quality of life and cause significant restrictions of their activities, they may want to consider joint replacement surgery. I briefly covered the risks, benefits and expected recovery after total joint arthroplasty.    Trini Sanchez may one day benefit from an elective total joint replacement however has not yet exhausted  other treatment options. I do not see a clear indication to move forward with arthroplasty. I have recommended the following and the patient is in agreement with the plan.  ***    All of the patient's questions were answered and {he/she/they:05346} was comfortable with this plan of care.  Trini Sanchez was encouraged to call if any problems, questions or concerns arise.     Follow-up {kwtime:65219}    ANN Ayoub, PA-C  Orthopaedic Physician Assistant  Division of Adult Reconstruction  Department of Orthopaedics  [place] [address]  Phone: [phone]  Fax: [phone]

## 2025-03-18 ENCOUNTER — APPOINTMENT (OUTPATIENT)
Dept: CARDIOLOGY | Facility: CLINIC | Age: 45
End: 2025-03-18
Payer: COMMERCIAL

## 2025-03-18 ENCOUNTER — APPOINTMENT (OUTPATIENT)
Dept: CARDIOLOGY | Facility: HOSPITAL | Age: 45
End: 2025-03-18
Payer: COMMERCIAL

## 2025-03-19 DIAGNOSIS — R20.2 PARESTHESIA OF BOTH HANDS: ICD-10-CM

## 2025-03-19 RX ORDER — DULOXETIN HYDROCHLORIDE 60 MG/1
60 CAPSULE, DELAYED RELEASE ORAL DAILY
Qty: 90 CAPSULE | Refills: 1 | Status: SHIPPED | OUTPATIENT
Start: 2025-03-19 | End: 2025-09-15

## 2025-03-20 ENCOUNTER — APPOINTMENT (OUTPATIENT)
Dept: ORTHOPEDIC SURGERY | Facility: CLINIC | Age: 45
End: 2025-03-20
Payer: COMMERCIAL

## 2025-03-20 ENCOUNTER — HOSPITAL ENCOUNTER (OUTPATIENT)
Dept: RADIOLOGY | Facility: CLINIC | Age: 45
End: 2025-03-20
Payer: COMMERCIAL

## 2025-03-20 DIAGNOSIS — E11.69 TYPE 2 DIABETES MELLITUS WITH OTHER SPECIFIED COMPLICATION, WITHOUT LONG-TERM CURRENT USE OF INSULIN: ICD-10-CM

## 2025-03-20 DIAGNOSIS — M25.569 KNEE PAIN, UNSPECIFIED CHRONICITY, UNSPECIFIED LATERALITY: ICD-10-CM

## 2025-03-20 RX ORDER — BLOOD-GLUCOSE SENSOR
EACH MISCELLANEOUS
Refills: 10 | OUTPATIENT
Start: 2025-03-20

## 2025-03-28 NOTE — PROGRESS NOTES
Trini Sanchez female   1980 44 y.o.   [id number]      Chief Complaint  New patient, right breast abscess     History Of Present Illness  Trini Sanchez is a 44 y.o. female presenting with reoccurring chronic right breast abscess and breast imaging. She was prescribed doxycycline 100 mg x 14 days on 25. States the right breast abscess began to drain thick purulent fluid on its own a few weeks ago and continue to drain daily. She reports she was seen by a dermatologist last week and she was prescribed a 30 day course of doxycycline which she is currently taking and tolerating well. She denies pain to the breast. She is a smoker. She has a past medical history of hidradenitis suppurativa and does not have a dermatologist.  She denies breast biopsy or surgery. She has family history breast cancer in maternal cousin.     BREAST IMAGIN2025 Bilateral screening mammogram, BI-RADS Category 0. Right breast mass, likely recurrent abscess. 2025 Right ultrasound BI-RADS Category 2. Findings suggestive of complex cystic masses in the dermis/areolar region of the right breast, most consistent with recurrent/chronic abscesses. 2025 RIGHT breast ultrasound BI-RADS Category 2. RIGHT breast  4.1 x 4.0 x 0.8  cm, previously 3.8 x 2.8 x 1.5 cm, and demonstrates a tract towards an open wound noted by the sonographer. An additional adjacent abscess measures 1.4 x 0.3 x 0.7 cm, previously 1.9 x 1.6 x 0.9 cm. No drainable collection is seen.    REPRODUCTIVE HISTORY: menarche age 12, nilliparous,  OCP's 5-10 years, perimenopausal,  no HRT, scattered breast tissue                                   FAMILY CANCER HISTORY:   Maternal aunt: Colon cancer age 40s  Maternal cousin: Breast cancer age 40     Surgical History  She has a past surgical history that includes Tonsillectomy (2014); Laparoscopy diagnostic / biopsy / aspiration / lysis (2014); and Tubal ligation (10/04/2016).     Social History  She  reports that she has been smoking cigarettes. She has a 17 pack-year smoking history. She has never used smokeless tobacco. She reports that she does not currently use alcohol. She reports current drug use. Frequency: 7.00 times per week. Drug: Marijuana.    Family History  Family History   Problem Relation Name Age of Onset   • Other (cerebral infarction) Mother     • Heart failure Mother     • Diabetes Mother     • Fibromyalgia Mother     • Hypertension Mother     • Other (methicillin resistant staphylococcus aureus infection) Mother     • Rheum arthritis Mother     • Stroke Mother     • Arthritis Father     • Hypertension Father     • Obesity Father     • Other (pacemaker placement) Father     • Other (uterine leiomyoma) Cousin     • Breast cancer Maternal Cousin  40        Allergies  Aspirin and Nsaids (non-steroidal anti-inflammatory drug)    Medications  Current Outpatient Medications   Medication Instructions   • albuterol 90 mcg/actuation inhaler 2 puffs, inhalation, Every 4 hours PRN   • alcohol swabs pads, medicated USE TO CLEAN SKIN PRIOR TO INSULIN INJECTION OR BLOOD GLUCOSE CHECK 4 TIMES DAILY   • allopurinol (ZYLOPRIM) 100 mg, oral, Daily   • amLODIPine (NORVASC) 10 mg, oral, Daily   • atorvastatin (LIPITOR) 10 mg, oral, Daily   • blood-glucose meter misc Use to check blood sugars   • calcitriol (ROCALTROL) 0.25 mcg, oral, Every other day   • carvedilol (COREG) 25 mg, oral, 2 times daily   • chlorthalidone (HYGROTON) 25 mg, Daily   • cholecalciferol (VITAMIN D-3) 5,000 Units, Daily   • clindamycin (Cleocin T) 1 % lotion APPLY A THIN LAYER TOPICALLY TO THE AFFECTED AREA ONCE IN THE MORING   • cyclobenzaprine (FLEXERIL) 10 mg, oral, 2 times daily   • dulaglutide (TRULICITY) 3 mg, subcutaneous, Once Weekly   • DULoxetine (CYMBALTA) 60 mg, oral, Daily, Do not crush or chew.   • Farxiga 10 mg, oral, Daily before breakfast   • fluticasone (Flonase) 50 mcg/actuation nasal spray 1 spray, Each Nostril, Daily  "  • FreeStyle Jessica 3 [place] misc Use as instructed   • FreeStyle Jessica 3 Sensor device USE TO TEST BLOOD SUGAR AS DIRECTED; CHANGE SENSOR EVERY 14 DAYS   • furosemide (LASIX) 40 mg, oral, 2 times daily (morning and late afternoon)   • glucagon 3 mg/actuation spray,non-aerosol USE 1 SPRAY INTRANASALLY AS NEEDED FOR BLOOD SUGAR <70   • medroxyPROGESTERone (DEPO-PROVERA) 150 mg, intramuscular, Every 3 months   • OneTouch Delica Plus Lancet 33 gauge misc USE TO CHECK GLUCOSE 4 TIMES A DAY   • OneTouch Ultra Test strip CHECK BLOOD SUGARS 4 TIMES DAILY   • pregabalin (Lyrica) 75 mg capsule 1 tablet, Every 12 hours scheduled (0630,1830)   • Sure Comfort Pen Needle 32 gauge x 5/32\" needle 1 EACH BEFORE MEALS AND AT BEDTIME   • traZODone (DESYREL) 100 mg, oral, Nightly PRN   • valsartan (DIOVAN) 320 mg, oral, Daily   • varenicline tartrate (CHANTIX) 1 mg, oral, 2 times daily, Take with full glass of water.         REVIEW OF SYSTEMS    Constitutional:  Negative for appetite change, fatigue, fever and unexpected weight change.   HENT:  Negative for ear pain, hearing loss, nosebleeds, sore throat and trouble swallowing.    Eyes:  Negative for discharge, itching and visual disturbance.   Respiratory:  Negative for cough, chest tightness and shortness of breath.    Cardiovascular:  Negative for chest pain, palpitations and leg swelling.   Breast: as indicated in HPI  Gastrointestinal:  Negative for abdominal pain, constipation, diarrhea and nausea.   Endocrine: Negative for cold intolerance and heat intolerance.   Genitourinary:  Negative for dysuria, frequency, hematuria, pelvic pain and vaginal bleeding.   Musculoskeletal:  Negative for arthralgias, back pain, gait problem, joint swelling and myalgias.   Skin:  Negative for color change and rash.   Allergic/Immunologic: Negative for environmental allergies and food allergies.   Neurological:  Negative for dizziness, tremors, speech difficulty, weakness, numbness and headaches. "   Hematological:  Does not bruise/bleed easily.   Psychiatric/Behavioral:  Negative for agitation, dysphoric mood and sleep disturbance. The patient is not nervous/anxious.         Past Medical History  She has a past medical history of Adjustment disorder with depressed mood (01/02/2019), Encounter for other preprocedural examination (03/12/2018), Furuncle of limb, unspecified (08/24/2018), Pain in right finger(s) (04/03/2017), and Personal history of diseases of the skin and subcutaneous tissue (05/24/2016).    She has no past medical history of Personal history of irradiation.     Physical Exam  Patient is alert and oriented x3 and in a relaxed and appropriate mood. Her gait is steady and hand grasps are equal. Sclera is clear. The breasts are nearly symmetrical. Right breast, subareolar soft mass 3 x 3 cm with pain elicited on exam.  The tissue is soft without palpable abnormalities, discrete nodules or masses. The skin is normal and left nipple appear normal. Bilateral axillary tunneling with no erythema or inflammation. There is no cervical, supraclavicular or axillary lymphadenopathy. Heart rate and rhythm normal, S1 and S2 appreciated. The lungs are clear to auscultation bilaterally. Abdomen is soft and non-tender.       Physical Exam     Last Recorded Vitals  Vitals:    04/04/25 1204   BP: (!) 164/96   Pulse: 97   Resp: 20   Temp: 36.2 °C (97.2 °F)   SpO2: 97%       Patient asymptomatic - denies HA, blurred vision, SOB, chest pain. States she has not taken her BP medications yet.     Relevant Results   Time was spent viewing digital images of the radiology testing with the patient. I explained the results in depth, along with suggested explanation for follow up recommendations based on the testing results. BI-RADS Category 2    Imaging  Narrative & Impression   Interpreted By:  Kevin Angeles,  and Kolton Buckley   STUDY:  BI US BREAST LIMITED RIGHT;  4/4/2025 11:46 am      ACCESSION  NUMBER(S):  [id number]      ORDERING CLINICIAN:  LUANA NAYA      INDICATION:  Follow-up imaging of probable recurrent/chronic abscess in the  subareolar right breast. History of recurrent abscesses and  hidradenitis.      ,N61.1 Abscess of the breast and nipple      COMPARISON:  Breast ultrasound 02/17/2025      FINDINGS:  Targeted ultrasound was performed of the subareolar right breast by a  registered sonographer with elastography.      Multiple oval circumscribed parallel heterogeneously hypoechoic  masses consistent with abscesses are again seen in the subcutaneous  tissue of the subareolar region. The largest measures 4.1 x 4.0 x 0.8  cm, previously 3.8 x 2.8 x 1.5 cm, and demonstrates a tract towards  an open wound noted by the sonographer. An additional adjacent  abscess measures 1.4 x 0.3 x 0.7 cm, previously 1.9 x 1.6 x 0.9 cm.  No drainable collection is seen.      The underlying breast parenchyma remains unremarkable.      IMPRESSION:  Persistent residual recurrent/chronic subareolar abscesses. Clinical  follow-up is recommended. Imaging follow-up could be obtained as  clinically warranted following treatment to assess for resolution.      BI-RADS CATEGORY:  BI-RADS Category:  2 Benign.  Recommendation:  Clinical follow-up.  Recommended Date:  At the discretion of the ordering provider.  Laterality:  Right.           Assessment/Plan   Right breast reoccurring right breast abscess. Doxycycline prescribed. Discussed pain relief measures with warm compress multiple times a day. Right breast ultrasound ordered for 6 weeks. Follow up in 2 weeks.    Patient Discussion/Summary  Please start Doxycycline 100 mg twice a day for 14 days. Apply warm compresses multiple times a day for pain relief. Follow up in 2 weeks. Referral placed to Dermatology.    You can see your health information, review clinical summaries from office visits & test results online when you follow your health with MY  Chart, a personal  health record. To sign up go to [email or web address]. If you need assistance with signing up or trouble getting into your account call [employer] Patient Line 24/7 at [phone].    My office phone number is [phone] if you need to get in touch with me or have additional questions or concerns. Thank you for choosing [facility] and trusting me as your healthcare provider. I look forward to seeing you again at your next office visit. I am honored to be a provider on your health care team and I remain dedicated to helping you achieve your health goals.      Samantha Nettles, APRN-CNP

## 2025-04-04 ENCOUNTER — HOSPITAL ENCOUNTER (OUTPATIENT)
Dept: RADIOLOGY | Facility: HOSPITAL | Age: 45
Discharge: HOME | End: 2025-04-04
Payer: COMMERCIAL

## 2025-04-04 ENCOUNTER — OFFICE VISIT (OUTPATIENT)
Dept: SURGICAL ONCOLOGY | Facility: HOSPITAL | Age: 45
End: 2025-04-04
Payer: COMMERCIAL

## 2025-04-04 VITALS
RESPIRATION RATE: 20 BRPM | SYSTOLIC BLOOD PRESSURE: 164 MMHG | HEIGHT: 66 IN | HEART RATE: 97 BPM | WEIGHT: 250 LBS | OXYGEN SATURATION: 97 % | DIASTOLIC BLOOD PRESSURE: 96 MMHG | BODY MASS INDEX: 40.18 KG/M2 | TEMPERATURE: 97.2 F

## 2025-04-04 DIAGNOSIS — N61.1 ABSCESS OF RIGHT BREAST: ICD-10-CM

## 2025-04-04 PROCEDURE — 3077F SYST BP >= 140 MM HG: CPT

## 2025-04-04 PROCEDURE — 76642 ULTRASOUND BREAST LIMITED: CPT | Mod: RT

## 2025-04-04 PROCEDURE — 76982 USE 1ST TARGET LESION: CPT

## 2025-04-04 PROCEDURE — 99214 OFFICE O/P EST MOD 30 MIN: CPT | Mod: 25

## 2025-04-04 PROCEDURE — 99214 OFFICE O/P EST MOD 30 MIN: CPT

## 2025-04-04 PROCEDURE — 3080F DIAST BP >= 90 MM HG: CPT

## 2025-04-04 PROCEDURE — 3008F BODY MASS INDEX DOCD: CPT

## 2025-04-04 ASSESSMENT — PAIN SCALES - GENERAL: PAINLEVEL_OUTOF10: 7

## 2025-04-08 ENCOUNTER — APPOINTMENT (OUTPATIENT)
Facility: CLINIC | Age: 45
End: 2025-04-08
Payer: COMMERCIAL

## 2025-04-08 VITALS
BODY MASS INDEX: 40.98 KG/M2 | WEIGHT: 255 LBS | DIASTOLIC BLOOD PRESSURE: 96 MMHG | SYSTOLIC BLOOD PRESSURE: 154 MMHG | HEIGHT: 66 IN

## 2025-04-08 DIAGNOSIS — R10.2 CHRONIC PELVIC PAIN IN FEMALE: Primary | ICD-10-CM

## 2025-04-08 DIAGNOSIS — Z11.3 ROUTINE SCREENING FOR STI (SEXUALLY TRANSMITTED INFECTION): ICD-10-CM

## 2025-04-08 DIAGNOSIS — G89.29 CHRONIC PELVIC PAIN IN FEMALE: Primary | ICD-10-CM

## 2025-04-08 DIAGNOSIS — Z01.419 WELL WOMAN EXAM WITH ROUTINE GYNECOLOGICAL EXAM: ICD-10-CM

## 2025-04-08 NOTE — PATIENT INSTRUCTIONS
Continue doxycycline as prescribed.  Apply warm compresses multiple times a day for pain relief. Follow up in 3 weeks.   You can see your health information, review clinical summaries from office visits & test results online when you follow your health with MY  Chart, a personal health record. To sign up go to [email or web address]. If you need assistance with signing up or trouble getting into your account call [employer] Patient Line 24/7 at [phone].    My office phone number is [phone] if you need to get in touch with me or have additional questions or concerns. Thank you for choosing [facility] and trusting me as your healthcare provider. I look forward to seeing you again at your next office visit. I am honored to be a provider on your health care team and I remain dedicated to helping you achieve your health goals.

## 2025-04-08 NOTE — PROGRESS NOTES
Subjective   Trini Sanchez is a 44 y.o.  who presents today for an annual exam.     Concerns today:  H/o endometriosis on depo, had been on lupron before, but gets symptom relief. Has been on depo for years. Last depo in March. History of very heavy painful periods her whole life. Used to be 7 days, heavy for 5 days. Inquiring about hysterectomy so that she can stop depo usage.  Has not had a pap smear in years, but has significant pelvic pain and has had horrible pain in office upon attempts. Desires exam under anesthesia.     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  who presents today for an annual exam and consultation regarding chronic pelvic pain and c/f endometriosis.     Health Maintenance  -Pap: due, no pap since . Discussed options for office exam with pre medication, patient would like to proceed with evaluation in operating room for exam under anesthesia.  -Mammogram: up to date, followed for recurrent chronic subareolar abscess with  breast surgery  -STI: urine screening today  -Encouraged tobacco cessation and importance for respiratory status for procedures and overall health    Chronic pelvic pain  - h/o endometriosis, believes was clinical dx  - h/o laparoscopy but pt unsure for what and unable to obtain operative report from 2010  - She is expressing interest in hysterectomy at this time, given h/o pelvic pain and does not desire to continue on depo indefinitely. We talked about other options for menstrual suppression and endometriosis pain. She has been on depo for extended period of time and has other risk factors for bone mineral density reduction, and would be reasonable to consider alternative forms at this time.   - Given plan to perform EUA and pap, discussed endometrial biopsy at that time if she does ultimately desire hysterectomy.  - Briefly discussed risks of anesthesia and risk of surgery being significant in light of co-morbidities    Will place case request for EUA, pap smear, endometrial biopsy, any other indicated procedures. Consents signed today. Will require PAT.     Agnes Nunes MD

## 2025-04-09 ENCOUNTER — OFFICE VISIT (OUTPATIENT)
Dept: PRIMARY CARE | Facility: CLINIC | Age: 45
End: 2025-04-09
Payer: COMMERCIAL

## 2025-04-09 VITALS
RESPIRATION RATE: 16 BRPM | TEMPERATURE: 97.3 F | OXYGEN SATURATION: 99 % | WEIGHT: 250 LBS | HEART RATE: 99 BPM | BODY MASS INDEX: 40.18 KG/M2 | DIASTOLIC BLOOD PRESSURE: 85 MMHG | HEIGHT: 66 IN | SYSTOLIC BLOOD PRESSURE: 137 MMHG

## 2025-04-09 DIAGNOSIS — E11.69 TYPE 2 DIABETES MELLITUS WITH OTHER SPECIFIED COMPLICATION, UNSPECIFIED WHETHER LONG TERM INSULIN USE (MULTI): ICD-10-CM

## 2025-04-09 DIAGNOSIS — I10 PRIMARY HYPERTENSION: ICD-10-CM

## 2025-04-09 DIAGNOSIS — F17.200 TOBACCO DEPENDENCE SYNDROME: ICD-10-CM

## 2025-04-09 DIAGNOSIS — I50.9 CONGESTIVE HEART FAILURE, UNSPECIFIED HF CHRONICITY, UNSPECIFIED HEART FAILURE TYPE: Primary | ICD-10-CM

## 2025-04-09 DIAGNOSIS — G47.00 INSOMNIA, UNSPECIFIED TYPE: ICD-10-CM

## 2025-04-09 PROCEDURE — 3079F DIAST BP 80-89 MM HG: CPT | Performed by: INTERNAL MEDICINE

## 2025-04-09 PROCEDURE — 3008F BODY MASS INDEX DOCD: CPT | Performed by: INTERNAL MEDICINE

## 2025-04-09 PROCEDURE — 99214 OFFICE O/P EST MOD 30 MIN: CPT | Performed by: INTERNAL MEDICINE

## 2025-04-09 PROCEDURE — 4010F ACE/ARB THERAPY RXD/TAKEN: CPT | Performed by: INTERNAL MEDICINE

## 2025-04-09 PROCEDURE — 3075F SYST BP GE 130 - 139MM HG: CPT | Performed by: INTERNAL MEDICINE

## 2025-04-09 SDOH — ECONOMIC STABILITY: FOOD INSECURITY: WITHIN THE PAST 12 MONTHS, YOU WORRIED THAT YOUR FOOD WOULD RUN OUT BEFORE YOU GOT MONEY TO BUY MORE.: NEVER TRUE

## 2025-04-09 SDOH — ECONOMIC STABILITY: FOOD INSECURITY: WITHIN THE PAST 12 MONTHS, THE FOOD YOU BOUGHT JUST DIDN'T LAST AND YOU DIDN'T HAVE MONEY TO GET MORE.: NEVER TRUE

## 2025-04-09 ASSESSMENT — PAIN SCALES - GENERAL: PAINLEVEL_OUTOF10: 0-NO PAIN

## 2025-04-09 ASSESSMENT — ENCOUNTER SYMPTOMS
DEPRESSION: 0
SHORTNESS OF BREATH: 0
VOMITING: 0
OCCASIONAL FEELINGS OF UNSTEADINESS: 0
FEVER: 0
ABDOMINAL PAIN: 0
LOSS OF SENSATION IN FEET: 0
CHILLS: 0
NAUSEA: 0

## 2025-04-09 ASSESSMENT — PATIENT HEALTH QUESTIONNAIRE - PHQ9
2. FEELING DOWN, DEPRESSED OR HOPELESS: NOT AT ALL
1. LITTLE INTEREST OR PLEASURE IN DOING THINGS: NOT AT ALL
SUM OF ALL RESPONSES TO PHQ9 QUESTIONS 1 AND 2: 0

## 2025-04-09 ASSESSMENT — LIFESTYLE VARIABLES: HOW MANY STANDARD DRINKS CONTAINING ALCOHOL DO YOU HAVE ON A TYPICAL DAY: PATIENT DOES NOT DRINK

## 2025-04-09 NOTE — PROGRESS NOTES
"Subjective   Patient ID: Trini Sanchez is a 44 y.o. female who presents for Follow-up.    Presents for follow up. Mistakenly started taking 200 mg of Trazadone instead of 100 mg and has now run out. States the 100 mg dose did work. Has upcoming appointment with Sleep Medicine as well. The Chantix did not work for smoking cessation.          Review of Systems   Constitutional:  Negative for chills and fever.   Respiratory:  Negative for shortness of breath.    Cardiovascular:  Negative for chest pain.   Gastrointestinal:  Negative for abdominal pain, nausea and vomiting.       Objective   /85   Pulse 99   Temp 36.3 °C (97.3 °F) (Temporal)   Resp 16   Ht 1.676 m (5' 6\")   Wt 113 kg (250 lb)   SpO2 99%   BMI 40.35 kg/m²     Physical Exam  Gen appearance: well groomed in NAD  A & O: alert and oriented x 3  CV: RRR   Lungs: CTA bilaterally  Extr: no edema   Assessment/Plan   Tobacco use: not interested in any other cessation aid at this time as has tried basically everything.  Insomnia: f/up with Sleep Medicine  CHF: f/up with Cards  HTN: cont meds  RTO Oct  DM: cont med. Keep Endo appt  Greater than 40 minutes were spent on the care and coordination of care of the patient.        "

## 2025-04-11 ENCOUNTER — APPOINTMENT (OUTPATIENT)
Facility: CLINIC | Age: 45
End: 2025-04-11
Payer: COMMERCIAL

## 2025-04-14 ENCOUNTER — APPOINTMENT (OUTPATIENT)
Dept: OBSTETRICS AND GYNECOLOGY | Facility: CLINIC | Age: 45
End: 2025-04-14
Payer: COMMERCIAL

## 2025-04-16 NOTE — PROGRESS NOTES
Trini Sanchez female   1980 44 y.o.   [id number]      Chief Complaint  New patient, right breast abscess     History Of Present Illness  Trini Sanchez is a 44 y.o. female presenting with reoccurring chronic right breast abscess and breast imaging. She was prescribed doxycycline 100 mg x 14 days on 25. States the right breast abscess began to drain thick purulent fluid on its own a few weeks ago and continue to drain daily. She reports she was seen by a dermatologist last week and she was prescribed a 30 day course of doxycycline which she is currently taking and tolerating well. She denies pain to the breast. She is a smoker. She has a past medical history of hidradenitis suppurativa and does not have a dermatologist.  She denies breast biopsy or surgery. She has family history breast cancer in maternal cousin.     BREAST IMAGIN2025 Bilateral screening mammogram, BI-RADS Category 0. Right breast mass, likely recurrent abscess. 2025 Right ultrasound BI-RADS Category 2. Findings suggestive of complex cystic masses in the dermis/areolar region of the right breast, most consistent with recurrent/chronic abscesses. 2025 RIGHT breast ultrasound BI-RADS Category 2. RIGHT breast  4.1 x 4.0 x 0.8 cm, previously 3.8 x 2.8 x 1.5 cm, and demonstrates a tract towards an open wound noted by the sonographer. An additional adjacent abscess measures 1.4 x 0.3 x 0.7 cm, previously 1.9 x 1.6 x 0.9 cm. No drainable collection is seen.    REPRODUCTIVE HISTORY: menarche age 12, nilliparous,  OCP's 5-10 years, perimenopausal,  no HRT, scattered breast tissue                                   FAMILY CANCER HISTORY:   Maternal aunt: Colon cancer age 40s  Maternal cousin: Breast cancer age 40     Surgical History  She has a past surgical history that includes Tonsillectomy (2014); Laparoscopy diagnostic / biopsy / aspiration / lysis (2014); and Tubal ligation (10/04/2016).     Social History  She  reports that she has been smoking cigarettes. She has a 17 pack-year smoking history. She has never used smokeless tobacco. She reports that she does not currently use alcohol. She reports current drug use. Frequency: 7.00 times per week. Drug: Marijuana.    Family History  Family History   Problem Relation Name Age of Onset    Other (cerebral infarction) Mother      Heart failure Mother      Diabetes Mother      Fibromyalgia Mother      Hypertension Mother      Other (methicillin resistant staphylococcus aureus infection) Mother      Rheum arthritis Mother      Stroke Mother      Arthritis Father      Hypertension Father      Obesity Father      Other (pacemaker placement) Father      Other (uterine leiomyoma) Cousin      Breast cancer Maternal Cousin  40        Allergies  Aspirin and Nsaids (non-steroidal anti-inflammatory drug)    Medications  Current Outpatient Medications   Medication Instructions    albuterol 90 mcg/actuation inhaler 2 puffs, inhalation, Every 4 hours PRN    alcohol swabs pads, medicated USE TO CLEAN SKIN PRIOR TO INSULIN INJECTION OR BLOOD GLUCOSE CHECK 4 TIMES DAILY    allopurinol (ZYLOPRIM) 100 mg, oral, Daily    amLODIPine (NORVASC) 10 mg, oral, Daily    atorvastatin (LIPITOR) 10 mg, oral, Daily    blood-glucose meter misc Use to check blood sugars    calcitriol (ROCALTROL) 0.25 mcg, oral, Every other day    carvedilol (COREG) 25 mg, oral, 2 times daily    chlorthalidone (HYGROTON) 25 mg, Daily    clindamycin (Cleocin T) 1 % lotion APPLY A THIN LAYER TOPICALLY TO THE AFFECTED AREA ONCE IN THE MORING    cyclobenzaprine (FLEXERIL) 10 mg, oral, 2 times daily    dulaglutide (TRULICITY) 3 mg, subcutaneous, Once Weekly    DULoxetine (CYMBALTA) 60 mg, oral, Daily, Do not crush or chew.    Farxiga 10 mg, oral, Daily before breakfast    fluticasone (Flonase) 50 mcg/actuation nasal spray 1 spray, Each Nostril, Daily    FreeStyle Jessica 3 [place] misc Use as instructed    FreeStyle Jessica 3 Sensor  "device USE TO TEST BLOOD SUGAR AS DIRECTED; CHANGE SENSOR EVERY 14 DAYS    furosemide (LASIX) 40 mg, oral, 2 times daily (morning and late afternoon)    glucagon 3 mg/actuation spray,non-aerosol USE 1 SPRAY INTRANASALLY AS NEEDED FOR BLOOD SUGAR <70    medroxyPROGESTERone (DEPO-PROVERA) 150 mg, intramuscular, Every 3 months    OneTouch Delica Plus Lancet 33 gauge misc USE TO CHECK GLUCOSE 4 TIMES A DAY    OneTouch Ultra Test strip CHECK BLOOD SUGARS 4 TIMES DAILY    pregabalin (Lyrica) 75 mg capsule 1 tablet, Every 12 hours scheduled (0630,1830)    Sure Comfort Pen Needle 32 gauge x 5/32\" needle 1 EACH BEFORE MEALS AND AT BEDTIME    traZODone (DESYREL) 100 mg, oral, Nightly PRN    valsartan (DIOVAN) 320 mg, oral, Daily         REVIEW OF SYSTEMS    Constitutional:  Negative for appetite change, fatigue, fever and unexpected weight change.   HENT:  Negative for ear pain, hearing loss, nosebleeds, sore throat and trouble swallowing.    Eyes:  Negative for discharge, itching and visual disturbance.   Respiratory:  Negative for cough, chest tightness and shortness of breath.    Cardiovascular:  Negative for chest pain, palpitations and leg swelling.   Breast: as indicated in HPI  Gastrointestinal:  Negative for abdominal pain, constipation, diarrhea and nausea.   Endocrine: Negative for cold intolerance and heat intolerance.   Genitourinary:  Negative for dysuria, frequency, hematuria, pelvic pain and vaginal bleeding.   Musculoskeletal:  Negative for arthralgias, back pain, gait problem, joint swelling and myalgias.   Skin:  Negative for color change and rash.   Allergic/Immunologic: Negative for environmental allergies and food allergies.   Neurological:  Negative for dizziness, tremors, speech difficulty, weakness, numbness and headaches.   Hematological:  Does not bruise/bleed easily.   Psychiatric/Behavioral:  Negative for agitation, dysphoric mood and sleep disturbance. The patient is not nervous/anxious.     "     Past Medical History  She has a past medical history of Adjustment disorder with depressed mood (01/02/2019), Encounter for other preprocedural examination (03/12/2018), Furuncle of limb, unspecified (08/24/2018), Pain in right finger(s) (04/03/2017), and Personal history of diseases of the skin and subcutaneous tissue (05/24/2016).    She has no past medical history of Personal history of irradiation.     Physical Exam  Patient is alert and oriented x3 and in a relaxed and appropriate mood. Her gait is steady and hand grasps are equal. Sclera is clear. The breasts are nearly symmetrical. Right breast, NAC thickened and 3:00 1 cm from the nipple 0.5 cm open wound, .  The tissue is soft without palpable abnormalities, discrete nodules or masses. The skin is normal and left nipple appear normal. Bilateral axillary tunneling with no erythema or inflammation. There is no cervical, supraclavicular or axillary lymphadenopathy. Heart rate and rhythm normal, S1 and S2 appreciated. The lungs are clear to auscultation bilaterally. Abdomen is soft and non-tender.       Physical Exam     Last Recorded Vitals  There were no vitals filed for this visit.        Relevant Results   Time was spent viewing digital images of the radiology testing with the patient. I explained the results in depth, along with suggested explanation for follow up recommendations based on the testing results. BI-RADS Category 2    Imaging  Narrative & Impression   Interpreted By:  Kevin Angeles,  and Kolton Buckley   STUDY:  BI US BREAST LIMITED RIGHT;  4/4/2025 11:46 am      ACCESSION NUMBER(S):  [id number]      ORDERING CLINICIAN:  LUANA PERSON      INDICATION:  Follow-up imaging of probable recurrent/chronic abscess in the  subareolar right breast. History of recurrent abscesses and  hidradenitis.      ,N61.1 Abscess of the breast and nipple      COMPARISON:  Breast ultrasound 02/17/2025      FINDINGS:  Targeted ultrasound was performed  of the subareolar right breast by a  registered sonographer with elastography.      Multiple oval circumscribed parallel heterogeneously hypoechoic  masses consistent with abscesses are again seen in the subcutaneous  tissue of the subareolar region. The largest measures 4.1 x 4.0 x 0.8  cm, previously 3.8 x 2.8 x 1.5 cm, and demonstrates a tract towards  an open wound noted by the sonographer. An additional adjacent  abscess measures 1.4 x 0.3 x 0.7 cm, previously 1.9 x 1.6 x 0.9 cm.  No drainable collection is seen.      The underlying breast parenchyma remains unremarkable.      IMPRESSION:  Persistent residual recurrent/chronic subareolar abscesses. Clinical  follow-up is recommended. Imaging follow-up could be obtained as  clinically warranted following treatment to assess for resolution.      BI-RADS CATEGORY:  BI-RADS Category:  2 Benign.  Recommendation:  Clinical follow-up.  Recommended Date:  At the discretion of the ordering provider.  Laterality:  Right.           Assessment/Plan   Right breast reoccurring right breast abscess. Doxycycline 30 day supply prescribed by dermatologist.  Discussed pain relief measures with warm compress multiple times a day. Patient would like to be seen in breast clinic for follow up.     Patient Discussion/Summary  Continue doxycycline as prescribed.  Apply warm compresses multiple times a day for pain relief. Follow up in 3 weeks.   You can see your health information, review clinical summaries from office visits & test results online when you follow your health with MY  Chart, a personal health record. To sign up go to [email or web address]. If you need assistance with signing up or trouble getting into your account call [facility] Patient Line 24/7 at [phone].    My office phone number is [phone] if you need to get in touch with me or have additional questions or concerns. Thank you for choosing [facility] and trusting me as your healthcare  provider. I look forward to seeing you again at your next office visit. I am honored to be a provider on your health care team and I remain dedicated to helping you achieve your health goals.      Samantha Nettles, TODD-CNP

## 2025-04-21 NOTE — PROGRESS NOTES
Primary Care Physician: Alia Walker MD  Patient's Location: [facility] [address]    Date of Visit: 04/22/2025  3:30 PM EDT  Location of visit: [facility] [address]   Type of Visit: Established - First Seen: 12/10/2024   Last visit: 12/10/2024    HPI / Summary:   Trini Sanchez is a very pleasant 44 y.o. female presenting for management of Stage C HFpEF. She has a history of resistant hypertension, hyperlipidemia, obesity, type II DM, CKD stage IV and prior medication non-adherence.      Initial CV History:   Established with Dr. Nobles after a hospitalization for COVID, severe HTN and LINDEN/CKD. Noted to have HFpEF with moderate-severe MR, though degree of MR improved to mild on last echo.     Echo : 11/29/24     CONCLUSIONS:   1. The left ventricular systolic function is normal, with a visually estimated ejection fraction of 60-65%.   2. Spectral Doppler shows a Grade II (pseudonormal pattern) of left ventricular diastolic filling with an elevated left atrial pressure.   3. There is moderately increased septal thickness.   4. There is normal right ventricular global systolic function.   5. The left atrium is moderately dilated.   6. Moderately elevated pulmonary artery pressure.    Hospitalizations: 2/27-3/3/24 at [facility] for SOB     BP has been difficult to control.     Today:  She has been experiencing elevated blood pressure despite adherence to her medication regimen. She takes her medication later than usual, around 1 PM, and notes that while she has seen one good blood pressure reading recently, most have been elevated. She is unsure about her current medication regimen, as amlodipine and chlorthalidone were not included in her medication pack. She uses a service that packages her medications, but there have been errors, such as being instructed to take valsartan twice a day instead of once.    She experiences shortness of breath when climbing stairs and reports swelling in her legs, particularly the right leg,  which extends into the thigh. The swelling decreases when she lies down and urinates but returns when she sits for extended periods. She acknowledges a problem with elevation, as she sits a lot, contributing to the swelling. Her weight has increased from 238 pounds to 255 pounds since her last visit, which she attributes to fluid retention. She is currently taking Lasix 40 mg twice a day, which helps her urinate, but she is concerned about the weight gain and swelling.    She is scheduled for a gynecological procedure involving an endometrial biopsy and possibly a hysterectomy. She also has a breast cyst but clarifies that the upcoming procedure is not related to it.          Hospitalizations: 2/27-3/3/24 at [facility] for SOB   PM/SHx: CHF, DVT, HLD, HTN, MIRA  Social Hx: Current smoker (1.5 PPD), denies ETOH, uses Marijuana and opiates   Family Hx: Mother who has passed had valve replacement       Objective   Medical History:   She has a past medical history of Adjustment disorder with depressed mood (01/02/2019), Encounter for other preprocedural examination (03/12/2018), Furuncle of limb, unspecified (08/24/2018), Pain in right finger(s) (04/03/2017), and Personal history of diseases of the skin and subcutaneous tissue (05/24/2016).    She has no past medical history of Personal history of irradiation.  Surgical Hx:   She has a past surgical history that includes Tonsillectomy (03/18/2014); Laparoscopy diagnostic / biopsy / aspiration / lysis (03/18/2014); and Tubal ligation (10/04/2016).   Social Hx:   She reports that she has been smoking cigarettes. She has a 17 pack-year smoking history. She has never used smokeless tobacco. She reports that she does not currently use alcohol. She reports current drug use. Frequency: 7.00 times per week. Drug: Marijuana.  Family Hx:   Her family history includes Arthritis in her father; Breast cancer (age of onset: 40) in her maternal cousin; Diabetes in her mother; Fibromyalgia in  "her mother; Heart failure in her mother; Hypertension in her father and mother; Obesity in her father; Rheum arthritis in her mother; Stroke in her mother; cerebral infarction in her mother; methicillin resistant staphylococcus aureus infection in her mother; pacemaker placement in her father; uterine leiomyoma in her cousin.   Allergies:   Allergies   Allergen Reactions    Aspirin Itching    Nsaids (Non-Steroidal Anti-Inflammatory Drug) Unknown     Exam:       4/22/2025     3:41 PM 4/22/2025     3:40 PM 4/9/2025    10:49 AM 4/8/2025     1:12 PM 4/4/2025    12:04 PM 2/21/2025    11:25 AM   Vitals   Systolic 176 197 137 154 164 179   Diastolic 118 113 85 96 96 121   BP Location  Right arm       Heart Rate  103 99  97    Temp   36.3 °C (97.3 °F)  36.2 °C (97.2 °F)    Resp   16  20    Height  1.676 m (5' 6\") 1.676 m (5' 6\") 1.676 m (5' 6\") 1.664 m (5' 5.51\")    Weight (lb)  255 250 255 250    BMI  41.16 kg/m2 40.35 kg/m2 41.16 kg/m2 40.95 kg/m2    BSA (m2)  2.32 m2 2.29 m2 2.32 m2 2.29 m2    Visit Report Report Report Report Report Report Report     Wt Readings from Last 15 Encounters:   04/22/25 116 kg (255 lb)   04/09/25 113 kg (250 lb)   04/08/25 116 kg (255 lb)   04/04/25 113 kg (250 lb)   02/21/25 113 kg (249 lb 1.9 oz)   02/11/25 108 kg (238 lb)   12/30/24 108 kg (238 lb)   12/10/24 108 kg (238 lb)   11/25/24 110 kg (242 lb 3.2 oz)   09/18/24 107 kg (235 lb 12.8 oz)   03/26/24 100 kg (221 lb)   02/20/24 99.1 kg (218 lb 8 oz)   09/25/23 110 kg (243 lb 8 oz)   06/06/23 114 kg (251 lb 6 oz)   05/18/23 112 kg (247 lb 6 oz)     GEN: Pleasant, well-appearing, no acute distress.  HEENT: JVP not elevated, no icterus. No HJR  CHEST: No wheeze, good air movement.  CV: Normal rate, regular rhythm. Normal S1, inspiratory split S2, no m/r/g  ABD: Soft, ND, NT  EXT: Warm, well perfused, 2+ LE edema  NEURO: Pleasant, Oriented to plan    Labs:   CMP:  Recent Labs     09/18/24  1441 04/08/24  1125 05/30/23  0611 05/29/23  0638 " 05/28/23  0803    143 136 137 137   K 4.1 4.4 4.1 3.8 3.3*   * 110* 97* 97* 97*   CO2 27 25 23 26 27   ANIONGAP 12 12 20 18 16   BUN 30* 37* 14 20 31*   CREATININE 2.33* 2.86* 1.67* 1.95* 2.18*   EGFR 26* 20*  --   --   --    MG  --   --  2.12 2.19 2.16     Recent Labs     04/08/24  1125 05/30/23  0611 05/28/23  0803 05/27/23  0814 05/26/23  1809 05/26/23  1708 05/26/23  0823   ALBUMIN 3.5 3.0* 2.7*   < > 3.2*   < > 3.4   ALKPHOS 106  --   --   --  136*  --  163*   ALT 12  --   --   --  20  --  21   AST 16  --   --   --  23  --  19   BILITOT 0.3  --   --   --  0.8  --  0.7    < > = values in this interval not displayed.   CBC:  Recent Labs     05/30/23  0611 05/29/23  0638 05/28/23  0803 05/27/23  0814 05/26/23  1708   WBC 11.1 11.5* 11.5* 12.8* CANCELED   HGB 12.2 11.6* 11.7* 12.4 CANCELED   HCT 37.8 36.1 34.7* 37.0 CANCELED    318 314 329 CANCELED   MCV 98 98 96 96 CANCELED   HEME/ENDO:  Recent Labs     09/18/24  1441 04/08/24  1125 02/28/24  1447 05/26/23  0823 02/16/22  1622 12/03/21  1116   FERRITIN  --   --   --  540*  --   --    IRONSAT  --   --   --  10*  --  22*   TSH 1.32  --   --  1.05  --   --    HGBA1C 6.8* 7.1* 9.2* 19.5*   < >  --     < > = values in this interval not displayed.    CARDIAC:   Recent Labs     05/26/23  1146 05/26/23  0823 12/01/21 2112   TROPHS 18  --   --    BNP 44 57 200*     Recent Labs     04/08/24  1125 11/23/22  1020 08/24/18  1041   CHOL 125 177 228*   LDLF  --  115* 160*   LDLCALC 64  --   --    HDL 31.9 39.5* 41.3   TRIG 144 115 136   MICRO:   Recent Labs     12/01/21 2112   CRP 5.24*   No results found for the last 90 days.      Notable Studies, reviewed:   EKG:   Recent Labs     12/10/24  0905 05/30/23  0723 05/26/23  0824   VENTRATE 95 112 98   ATRRATE 95 112 98   QTCFRED 424 396 410   QRSDUR 74 84 74   QTCCALCB 457 439 444     Encounter Date: 12/10/24   ECG 12 Lead   Result Value    Ventricular Rate 95    Atrial Rate 95    ID Interval 122    QRS  Duration 74    QT Interval 364    QTC Calculation(Bazett) 457    P Axis 58    R Axis 49    T Axis 43    QRS Count 15    Q Onset 221    P Onset 160    P Offset 215    T Offset 403    QTC Fredericia 424    Narrative    Sinus rhythm with occasional Premature ventricular complexes  Possible Left atrial enlargement  Borderline ECG  Confirmed by Perry Chase ([id number]) on 12/19/2024 10:29:06 AM     Echocardiogram:   Recent Labs     11/29/24  1502   EF 63   LVIDD 4.44   RV 63.2   RVFRWALLPKSP 12.00   TAPSE 2.6   Transthoracic Echo (TTE) Complete 11/29/2024  PHYSICIAN INTERPRETATION:  Left Ventricle: The left ventricular systolic function is normal, with a visually estimated ejection fraction of 60-65%. There are no regional left ventricular wall motion abnormalities. The left ventricular cavity size is normal. There is moderately increased septal and normal posterior left ventricular wall thickness. Spectral Doppler shows a Grade II (pseudonormal pattern) of left ventricular diastolic filling with an elevated left atrial pressure.  Left Atrium: The left atrium is moderately dilated.  Right Ventricle: The right ventricle is normal in size. There is normal right ventricular global systolic function.  Right Atrium: The right atrium is normal in size.  Aortic Valve: The aortic valve is trileaflet. The aortic valve dimensionless index is 0.77. There is trace aortic valve regurgitation. The peak instantaneous gradient of the aortic valve is 14 mmHg. The mean gradient of the aortic valve is 6 mmHg.  Mitral Valve: The mitral valve is normal in structure. The peak instantaneous gradient of the mitral valve is 19 mmHg. There is mild mitral valve regurgitation. The mitral regurgitant orifice area is 12 mm2. The mitral regurgitant volume is 22.89 ml.  Tricuspid Valve: The tricuspid valve is structurally normal. There is mild tricuspid regurgitation.  Pulmonic Valve: The pulmonic valve is structurally normal. There is trace pulmonic valve  regurgitation.  Pericardium: Trivial pericardial effusion.  Aorta: The aortic root is normal.  Pulmonary Artery: The tricuspid regurgitant velocity is 3.88 m/s, and with an estimated right atrial pressure of 3 mmHg, the estimated pulmonary artery pressure is moderately elevated with the RVSP at 63.2 mmHg.  Systemic Veins: The inferior vena cava appears normal in size, with IVC inspiratory collapse greater than 50%.  In comparison to the previous echocardiogram(s): Compared with study dated 5/30/2023,. Hemodynamic pattern on current echocardiogram now consistent with hypertensive heart disease with acute diastolic heart failure.      CONCLUSIONS:  1. The left ventricular systolic function is normal, with a visually estimated ejection fraction of 60-65%.  2. Spectral Doppler shows a Grade II (pseudonormal pattern) of left ventricular diastolic filling with an elevated left atrial pressure.  3. There is moderately increased septal thickness.  4. There is normal right ventricular global systolic function.  5. The left atrium is moderately dilated.  6. Moderately elevated pulmonary artery pressure.    QUANTITATIVE DATA SUMMARY:    2D MEASUREMENTS:          Normal Ranges:  Ao Root d:       2.70 cm  (2.0-3.7cm)  LAs:             4.57 cm  (2.7-4.0cm)  IVSd:            1.43 cm  (0.6-1.1cm)  LVPWd:           0.69 cm  (0.6-1.1cm)  LVIDd:           4.44 cm  (3.9-5.9cm)  LVIDs:           3.27 cm  LV Mass Index:   75 g/m2  LVEDV Index:     34 ml/m2  LV % FS          26.2 %      LA VOLUME:                    Normal Ranges:  LA Vol A4C:        93.8 ml    (22+/-6mL/m2)  LA Vol A2C:        87.1 ml  LA Vol BP:         94.7 ml  LA Vol Index A4C:  43.3ml/m2  LA Vol Index A2C:  40.1 ml/m2  LA Vol Index BP:   43.6 ml/m2  LA Area A4C:       27.4 cm2  LA Area A2C:       25.2 cm2  LA Major Axis A4C: 6.8 cm  LA Major Axis A2C: 6.2 cm  LA Volume Index:   41.7 ml/m2      RA VOLUME BY A/L METHOD:          Normal Ranges:  RA Area A4C:              13.6 cm2      AORTA MEASUREMENTS:         Normal Ranges:  Asc Ao, d:          2.90 cm (2.1-3.4cm)      LV SYSTOLIC FUNCTION BY 2D PLANIMETRY (MOD):  Normal Ranges:  EF-A4C View:    61 % (>=55%)  EF-A2C View:    58 %  EF-Biplane:     61 %  EF-Visual:      63 %  LV EF Reported: 63 %      LV DIASTOLIC FUNCTION:           Normal Ranges:  MV Peak E:             1.62 m/s  (0.7-1.2 m/s)  MV Peak A:             1.43 m/s  (0.42-0.7 m/s)  E/A Ratio:             1.14      (1.0-2.2)  MV e'                  0.065 m/s (>8.0)  MV lateral e'          0.06 m/s  MV medial e'           0.07 m/s  E/e' Ratio:            24.98     (<8.0)      MITRAL VALVE:           Normal Ranges:  MV Vmax:      2.20 m/s  (<=1.3m/s)  MV peak P.4 mmHg (<5mmHg)  MV mean P.3 mmHg  (<2mmHg)  MV VTI:       38.23 cm  (10-13cm)  MV DT:        194 msec  (150-240msec)      MITRAL INSUFFICIENCY:             Normal Ranges:  MR VTI:               186.38 cm  MR Vmax:              644.09 cm/s  MR Volume:            22.89 ml  MR Flow Rt:           79.12 ml/s  MR EROA:              12 mm2      AORTIC VALVE:                      Normal Ranges:  AoV Vmax:                1.88 m/s  (<=1.7m/s)  AoV Peak P.2 mmHg (<20mmHg)  AoV Mean P.2 mmHg  (1.7-11.5mmHg)  LVOT Max Yonathan:            1.45 m/s  (<=1.1m/s)  AoV VTI:                 29.74 cm  (18-25cm)  LVOT VTI:                23.01 cm  LVOT Diameter:           1.83 cm   (1.8-2.4cm)  AoV Area, VTI:           2.04 cm2  (2.5-5.5cm2)  AoV Area,Vmax:           2.03 cm2  (2.5-4.5cm2)  AoV Dimensionless Index: 0.77      AORTIC INSUFFICIENCY:  AI Vmax:       5.10 m/s  AI Half-time:  189 msec  AI Decel Time: 650 msec  AI Decel Rate: 783.72 cm/s2      RIGHT VENTRICLE:  TAPSE: 26.0 mm  RV s'  0.12 m/s      TRICUSPID VALVE/RVSP:          Normal Ranges:  Peak TR Velocity:     3.88 m/s  Est. RA Pressure:     3 mmHg  RV Syst Pressure:     63 mmHg  (< 30mmHg)  IVC Diam:             1.30  cm        Coronary Angiography: No results found for this or any previous visit from the past 1800 days.    Right Heart Cath: No results found for this or any previous visit from the past 1800 days.    Cardiac Scoring: No results found for this or any previous visit from the past 1800 days.    Cardiac MRI: No results found for this or any previous visit from the past 1800 days.    Nuclear:No results found for this or any previous visit from the past 1800 days.    Metabolic Stress: No results found for this or any previous visit from the past 1800 days.      Current Outpatient Medications   Medication Instructions    albuterol 90 mcg/actuation inhaler 2 puffs, inhalation, Every 4 hours PRN    alcohol swabs pads, medicated USE TO CLEAN SKIN PRIOR TO INSULIN INJECTION OR BLOOD GLUCOSE CHECK 4 TIMES DAILY    allopurinol (ZYLOPRIM) 100 mg, oral, Daily    amLODIPine (NORVASC) 10 mg, oral, Daily    atorvastatin (LIPITOR) 10 mg, oral, Daily    blood-glucose meter misc Use to check blood sugars    calcitriol (ROCALTROL) 0.25 mcg, oral, Every other day    carvedilol (COREG) 25 mg, oral, 2 times daily    clindamycin (Cleocin T) 1 % lotion APPLY A THIN LAYER TOPICALLY TO THE AFFECTED AREA ONCE IN THE MORING    cloNIDine (Catapres-TTS) 0.1 mg/24 hr patch 1 patch, transdermal, Weekly, Apply one patch on the skin and replace every 7 days, as directed    cyclobenzaprine (FLEXERIL) 10 mg, oral, 2 times daily    dulaglutide (TRULICITY) 3 mg, subcutaneous, Once Weekly    DULoxetine (CYMBALTA) 60 mg, oral, Daily, Do not crush or chew.    Farxiga 10 mg, oral, Daily before breakfast    finerenone (KERENDIA) 10 mg, oral, Daily    fluticasone (Flonase) 50 mcg/actuation nasal spray 1 spray, Each Nostril, Daily    FreeStyle Jessica 3 [place] misc Use as instructed    FreeStyle Jessica 3 Sensor device USE TO TEST BLOOD SUGAR AS DIRECTED; CHANGE SENSOR EVERY 14 DAYS    furosemide (LASIX) 40 mg, oral, 2 times daily (morning and late afternoon)     "glucagon 3 mg/actuation spray,non-aerosol USE 1 SPRAY INTRANASALLY AS NEEDED FOR BLOOD SUGAR <70    medroxyPROGESTERone (DEPO-PROVERA) 150 mg, intramuscular, Every 3 months    OneTouch Delica Plus Lancet 33 gauge misc USE TO CHECK GLUCOSE 4 TIMES A DAY    OneTouch Ultra Test strip CHECK BLOOD SUGARS 4 TIMES DAILY    pregabalin (Lyrica) 75 mg capsule 1 tablet, Every 12 hours scheduled (0630,1830)    Sure Comfort Pen Needle 32 gauge x 5/32\" needle 1 EACH BEFORE MEALS AND AT BEDTIME    traZODone (DESYREL) 100 mg, oral, Nightly PRN    valsartan (DIOVAN) 320 mg, oral, Daily      Notable Therapies: *GDMT*   Class  Agent SAFETY    *ARNI* / ACE / ARB  valsartan 320mg daily Last BP: (!) 176/118, Last BUN/Cr (GFR): 9/18/2024: 30/2.33 (26)    *Beta-Blocker*  carvedilol 25mg BID Last HR: 103    *MRA*   Last K: 9/18/2024: 4.1     *SGLT2*  Farxiga 10mg daily Last A1c 9/18/2024: 6.8     Diuretic  chlorthalidone 25mg daily  furosemide 40mgBID Last BNP: 5/26/2023: 44    Hydralazine/ISDN       Digoxin  Last Digoxin level: No results found for requested labs within last 3650 days.    Anticoagulation   Last Hgb: 5/30/2023: 12.2    Anti-arrhythmic   Last QTc: 12/10/2024: 457    Antiplatelet   Last Platelet: 5/30/2023: 349    Lipid-Lowering  atorvastatin 10mg daily Last Tchol 4/8/2024: 125 / LDL 11/23/2022: 115 or 4/8/2024: 64 / HDL 4/8/2024: 31.9 / TRIG 4/8/2024: 144    Other   amLODIPine 10mg daily  dulaglutide     Device(s)   EF: 11/29/2024: 63%, QRS: 12/10/2024: 74ms    Cardiac Rehab,   if EF <35/MI/OHS        HFA-PEFF Score:  (echo/lab independently reviewed)   Functional Morphological  Biomarker (SR) Biomarker (AF)   Major  (2pts) [x] septal e’ < 7 cm/s or lateral e’ < 10 cm/s or  [x] Average E/e’ >= 15 or  [x] TR velocity > 2.8 m/s (PASP > 35 mmHg) [x] EMMA > 34 ml/m² or  [] LVMI >= 149/122 g/m² (m/w) and RWT > 0.42 []  NT-proBNP > 220 pg/ml or  []  BNP > 80 pg/ml []   NT-proBNP > 660 pg/ml or  []   BNP > 240 pg/ml   Minor  (1pt) " [] Average E/e’ 9-14 or  []  GLS < 16 % []  EMMA 29-34 ml/m² or  []  LVMI >= 115/95 g/m² (m/w) or  []  RWT > 0.42 or  [x]  LV wall thickness > 12 mm []   NT-proBNP 125-220 pg/ml or  [x]   BNP 35-80 pg/ml - NT-proBNP 365-660 pg/ml or  - -240 pg/ml   Total  >= 5 points: HFpEF  2-4 points: Diastolic Stress Test or Invasive Hemodynamic Measurements 2 2 1      Problems Addressed:   # HFpEF / Chronic Systolic Heart Failure, last EF 11/29/2024: 63%, HFA-PEFF score of 5/6 (c/w HFpEF)  # Resistant Hypertension: Last BP: (!) 176/118. Reports compliance. She is hypervolemic and will augment diuretics.   # Hyperlipidemia: Last Tchol 4/8/2024: 125 / LDL  No results found for requested labs within last 365 days. / HDL No results found for requested labs within last 365 days. / TRIG No results found for requested labs within last 365 days.. Continue statin  # Type II Diabetes Mellitus: Last A1c 9/18/2024: 6.8. Significant improvement in gylcemic control on meds. Continue  # Obesity: Last BMI: 41.18. Continue SGLT2i, diet, exercise, GLP1  # CKD: Last BUN/Cr (GFR): 9/18/2024: 30/2.33 (26). Close monitoring. Continue nehrology f/u  - cornerstone of management is aggressive blood pressure control. Remains very poorly controlled.   -- start clonidine patch  -- Consider adding finerenone if potassium levels allow.    # Tobacco Use: She reports that she has been smoking cigarettes. She has a 17 pack-year smoking history. She has never used smokeless tobacco.. 3 minutes spent discussing cessation  # Marijuana use: recommend cessation    Patient Instructions   If you have any questions or need cardiac medication refills, please call the Heart Failure office at [phone], option 6.  You may also contact the  Heart Failure Nursing team via email at [email or web address] (Please include your name and date of birth). To reach Dr. Martin's office please call [phone] / Fax: [phone]. To schedule an office  appointment call [phone].     If I placed a referral today for a specialist/procedure, please call the general scheduling line at [phone]. You may also schedule appointments on the [employer] sergio. For radiology scheduling (Cardiac calcium score, CTA with HeartFlow, Cardiac MRI, NM cardiac stress test, PET viability study) the phone number is [phone].     - Continue current medications with the exception of:   -- I ordered amlodipine 10mg daily,  -- start clonidine patch, once patch/week  -- start finerenone if renal function stable  - Labwork: today  - Imaging/Procedures: none  - Referrals: Nephrology - [place]  - Followup: 3 months       Orders:  Orders Placed This Encounter   Procedures    Basic Metabolic Panel    B-Type Natriuretic Peptide    CBC    Ferritin    Iron and TIBC    B-Type Natriuretic Peptide    Comprehensive Metabolic Panel    Referral to [facility]phrology - [facility]      Followup Appts:  Future Appointments   Date Time Provider Department Center   4/24/2025  1:00 PM Samantha Nettles APRN-CNP [id number] [facility]   5/7/2025 11:00 AM Cori Devlin APRN-CNP [id number] [facility]   5/8/2025  1:15 PM Vamshi Bermudez MD [id number] [facility]   5/12/2025  7:30 AM Vannessa Rodriguez APRN-CNP [id number] [facility]   8/6/2025  9:30 AM Daly Jesus MD [id number] [facility]   8/18/2025  9:00 AM Agnes Nunes MD [id number] [facility]   10/15/2025 11:00 AM Alia Walker MD [id number] [facility]     MDM: moderate  Time Spent: I spent 30 minutes reviewing medical testing, obtaining medical history and counselling and educating on diagnosis and documenting clinical encounter. The encounter involved a comprehensive review of extensive data, including prior medical records, imaging studies, laboratory results, and consultations, followed by in-depth counseling regarding the patient's complex cardiac condition and comorbidities. We discussed treatment options, risks and benefits, and  recommendations for ongoing care and careful monitoring of adverse effects from medications.     ____________________________________________________________  Talat Martin MD  Section of Advanced Heart Failure and Cardiac Transplantation  Division of Cardiovascular Medicine  [facility]

## 2025-04-22 ENCOUNTER — OFFICE VISIT (OUTPATIENT)
Dept: CARDIOLOGY | Facility: CLINIC | Age: 45
End: 2025-04-22
Payer: COMMERCIAL

## 2025-04-22 VITALS
BODY MASS INDEX: 40.98 KG/M2 | WEIGHT: 255 LBS | HEIGHT: 66 IN | HEART RATE: 103 BPM | OXYGEN SATURATION: 96 % | DIASTOLIC BLOOD PRESSURE: 118 MMHG | SYSTOLIC BLOOD PRESSURE: 176 MMHG

## 2025-04-22 DIAGNOSIS — N18.4 CKD (CHRONIC KIDNEY DISEASE) STAGE 4, GFR 15-29 ML/MIN (MULTI): ICD-10-CM

## 2025-04-22 DIAGNOSIS — I50.33 ACUTE ON CHRONIC HEART FAILURE WITH PRESERVED EJECTION FRACTION: ICD-10-CM

## 2025-04-22 DIAGNOSIS — Z72.0 TOBACCO ABUSE: ICD-10-CM

## 2025-04-22 DIAGNOSIS — I10 PRIMARY HYPERTENSION: Primary | ICD-10-CM

## 2025-04-22 DIAGNOSIS — E11.69 TYPE 2 DIABETES MELLITUS WITH OTHER SPECIFIED COMPLICATION, WITHOUT LONG-TERM CURRENT USE OF INSULIN: ICD-10-CM

## 2025-04-22 PROCEDURE — 3008F BODY MASS INDEX DOCD: CPT | Performed by: STUDENT IN AN ORGANIZED HEALTH CARE EDUCATION/TRAINING PROGRAM

## 2025-04-22 PROCEDURE — 3080F DIAST BP >= 90 MM HG: CPT | Performed by: STUDENT IN AN ORGANIZED HEALTH CARE EDUCATION/TRAINING PROGRAM

## 2025-04-22 PROCEDURE — 3077F SYST BP >= 140 MM HG: CPT | Performed by: STUDENT IN AN ORGANIZED HEALTH CARE EDUCATION/TRAINING PROGRAM

## 2025-04-22 PROCEDURE — 99214 OFFICE O/P EST MOD 30 MIN: CPT | Performed by: STUDENT IN AN ORGANIZED HEALTH CARE EDUCATION/TRAINING PROGRAM

## 2025-04-22 PROCEDURE — 4010F ACE/ARB THERAPY RXD/TAKEN: CPT | Performed by: STUDENT IN AN ORGANIZED HEALTH CARE EDUCATION/TRAINING PROGRAM

## 2025-04-22 RX ORDER — CLONIDINE 0.1 MG/24H
1 PATCH, EXTENDED RELEASE TRANSDERMAL WEEKLY
Qty: 4 PATCH | Refills: 12 | Status: SHIPPED | OUTPATIENT
Start: 2025-04-22 | End: 2026-04-22

## 2025-04-22 RX ORDER — AMLODIPINE BESYLATE 10 MG/1
10 TABLET ORAL DAILY
Qty: 90 TABLET | Refills: 3 | Status: SHIPPED | OUTPATIENT
Start: 2025-04-22 | End: 2026-04-22

## 2025-04-22 NOTE — PATIENT INSTRUCTIONS
If you have any questions or need cardiac medication refills, please call the Heart Failure office at [phone], option 6.  You may also contact the  Heart Failure Nursing team via email at [email or web address] (Please include your name and date of birth). To reach Dr. Martin's office please call [phone] / Fax: [phone]. To schedule an office appointment call [phone].     If I placed a referral today for a specialist/procedure, please call the general scheduling line at [phone]. You may also schedule appointments on the [employer] sergio. For radiology scheduling (Cardiac calcium score, CTA with HeartFlow, Cardiac MRI, NM cardiac stress test, PET viability study) the phone number is [phone].     - Continue current medications with the exception of:   -- I ordered amlodipine 10mg daily,  -- start clonidine patch, once patch/week  -- start finerenone if renal function stable  - Labwork: today  - Imaging/Procedures: none  - Referrals: Nephrology Mónica Abdul  - Followup: 3 months

## 2025-04-23 LAB
ANION GAP SERPL CALCULATED.4IONS-SCNC: 10 MMOL/L (CALC) (ref 7–17)
BNP SERPL-MCNC: 74 PG/ML
BUN SERPL-MCNC: 25 MG/DL (ref 7–25)
BUN/CREAT SERPL: 13 (CALC) (ref 6–22)
CALCIUM SERPL-MCNC: 9.2 MG/DL (ref 8.6–10.2)
CHLORIDE SERPL-SCNC: 106 MMOL/L (ref 98–110)
CO2 SERPL-SCNC: 26 MMOL/L (ref 20–32)
CREAT SERPL-MCNC: 1.88 MG/DL (ref 0.5–0.99)
EGFRCR SERPLBLD CKD-EPI 2021: 33 ML/MIN/1.73M2
ERYTHROCYTE [DISTWIDTH] IN BLOOD BY AUTOMATED COUNT: 13.2 % (ref 11–15)
FERRITIN SERPL-MCNC: 119 NG/ML (ref 16–232)
GLUCOSE SERPL-MCNC: 157 MG/DL (ref 65–99)
HCT VFR BLD AUTO: 38.2 % (ref 35–45)
HGB BLD-MCNC: 12.6 G/DL (ref 11.7–15.5)
IRON SATN MFR SERPL: 18 % (CALC) (ref 16–45)
IRON SERPL-MCNC: 53 MCG/DL (ref 40–190)
MCH RBC QN AUTO: 31 PG (ref 27–33)
MCHC RBC AUTO-ENTMCNC: 33 G/DL (ref 32–36)
MCV RBC AUTO: 93.9 FL (ref 80–100)
PLATELET # BLD AUTO: 258 THOUSAND/UL (ref 140–400)
PMV BLD REES-ECKER: 10.3 FL (ref 7.5–12.5)
POTASSIUM SERPL-SCNC: 4.3 MMOL/L (ref 3.5–5.3)
RBC # BLD AUTO: 4.07 MILLION/UL (ref 3.8–5.1)
SODIUM SERPL-SCNC: 142 MMOL/L (ref 135–146)
TIBC SERPL-MCNC: 288 MCG/DL (CALC) (ref 250–450)
WBC # BLD AUTO: 10 THOUSAND/UL (ref 3.8–10.8)

## 2025-04-24 ENCOUNTER — APPOINTMENT (OUTPATIENT)
Dept: SURGICAL ONCOLOGY | Facility: HOSPITAL | Age: 45
End: 2025-04-24
Payer: COMMERCIAL

## 2025-04-29 DIAGNOSIS — I10 PRIMARY HYPERTENSION: ICD-10-CM

## 2025-04-29 DIAGNOSIS — I50.33 ACUTE ON CHRONIC HEART FAILURE WITH PRESERVED EJECTION FRACTION: ICD-10-CM

## 2025-04-29 DIAGNOSIS — Z72.0 TOBACCO ABUSE: ICD-10-CM

## 2025-04-29 DIAGNOSIS — N18.4 CKD (CHRONIC KIDNEY DISEASE) STAGE 4, GFR 15-29 ML/MIN (MULTI): ICD-10-CM

## 2025-04-29 DIAGNOSIS — E11.69 TYPE 2 DIABETES MELLITUS WITH OTHER SPECIFIED COMPLICATION, WITHOUT LONG-TERM CURRENT USE OF INSULIN: ICD-10-CM

## 2025-04-30 ENCOUNTER — APPOINTMENT (OUTPATIENT)
Dept: NEPHROLOGY | Facility: CLINIC | Age: 45
End: 2025-04-30
Payer: COMMERCIAL

## 2025-04-30 PROBLEM — F41.9 ANXIETY: Status: RESOLVED | Noted: 2023-10-19 | Resolved: 2025-04-30

## 2025-04-30 PROBLEM — E66.812 OBESITY, CLASS II, BMI 35-39.9: Status: RESOLVED | Noted: 2023-10-19 | Resolved: 2025-04-30

## 2025-04-30 NOTE — PROGRESS NOTES
[facility]  [facility]  [address]  [facility] [id number]  [phone]     [facility]  New Visit Note      Subjective   Patient ID: Trini Sanchez is a 44 y.o. female with past medical history significant for Morbid Obesity, Hypertension, Congestive Heart Failure, Deep vein thrombosis, diabetes, Chronic Kidney Disease, Depression, Anxiety, Insomnia, Nicotine dependence, Insomnia, Restless Legs Syndrome, and Sleep disorder breathing.     5/7/2025: The patient is here alone today and was referred by PCP Alia Walkre MD, for comprehensive sleep medicine evaluation due to suspected sleep apnea, excessive daytime sleepiness/fatigue, and difficulty falling/staying asleep. She stated someone witnessed her snoring and sleep apnea; besides, she gasped and choked during sleep. Her sleep quality was bad, and she always had difficulty falling asleep. She drinks six cans of soda, the last soda was at 11 PM, and smokes 1 PPD per day, the last cigarette is close to bedtime. She took a high dosage of Melatonin and Benadryl, but it did not work; her provider ordered the Trazodone 100 mg to help her sleep. Her ESS: 6, GUERA: 24 today.    HPI  Patient never had sleep study done yet.       SLEEP STUDY HISTORY: (personally reviewed raw data such as interpretation report, data sheet, hypnogram, and titration table if available and applicable)  N/A    SLEEP-WAKE SCHEDULE  Bedtime: 11 PM on weekdays, MN on weekends  Subjective sleep latency: 1 -2 hours  Problems falling asleep: Yes  Number of awakenings: 3-4 times per night spontaneously for unknown reasons (2 for BR)  Falls back asleep in 20 minutes  Problems staying asleep: Yes  Final wake time: 5:30 AM on weekdays, 8 AM on weekends  Shift work: No  Naps: Yes, 2 hours  Feels rested after a nap: No   Average sleep duration (excluding naps): 5 hours    SLEEP  ENVIRONMENT  Sleep location: bed  Sleep status: sleeps alone  Room is dark:  No, TV on  Room is quiet: No, TV on  Room is cool: Yes  Bed comfort: good    SLEEP HABITS:   Activities before bedtime: watch TV  Activities in bed: TV on  Preferred sleep position: side    SLEEP ROS:  Night symptoms: POSITIVE for snoring, witnessed apnea, wake up gasping and/or choking for air, nasal congestion , mouth breathing, night sweats during sleep, and heartburn or sour taste in mouth at night  Morning symptoms: POSITIVE for unrefreshing sleep, morning dry mouth, and sleep inertia  Daytime symptoms POSITIVE for excessive daytime sleepiness, fatigue, trouble remembering things in daytime, trouble staying focused in daytime, and irritability in daytime  Hypersomnia / narcolepsy symptoms: Patient denies symptoms of a hypersomnolence disorder such as sleep paralysis, sleep-related hallucinations, and cataplexy.   RLS symptoms: Frequency of RLS symptoms: 2/week  RLS symptoms affect sleep onset: Yes   RLS symptoms wakes patient up from sleep: Yes   Movements in sleep: POSITIVE for frequent leg kicks / jerks at night while asleep  Parasomnia symptoms: POSITIVE for nightmares    WEIGHT: fluctuating    REVIEW OF SYSTEMS: All other systems have been reviewed and are negative.    PERTINENT SOCIAL HISTORY:  Occupation: caregiver  Smoking: Yes  and 1 PPD/daily  ETOH: No   Marijuana: Yes   Caffeine: Yes, 6 cans soda daily, the last soda was 11 PM  Sleep aids: Yes  and Benadryl, Melatonin and Trazodone  Claustrophobia: No     PERTINENT PAST SURGICAL HISTORY:  tonsillectomy and adenoidectomy    PERTINENT FAMILY HISTORY:  sleep apnea on PAP- mother    Active Problems, Allergy List, Medication List, and PMH/PSH/FH/Social Hx have been reviewed and reconciled in chart. No significant changes unless documented in the pertinent chart section. Updates made when necessary.       Objective     REVIEW OF SYSTEMS  All other systems have been reviewed and are  negative.    ALLERGIES  Allergies[1]    MEDICATIONS  Current Medications[2]      Physical Exam  Constitutional:alert and oriented to time, place, and person  Sinus: - tenderness to palpation  Palate:Narrow Mallampati 3, - Tongue scalloping, - macroglossia  Lungs: Clear to auscultation bilateral, no rales  Heart: Regular rate and rhythm, no murmurs    Assessment/Plan   Trini Sanchez is a 44 y.o. female who is seen to evaluate for possible obstructive sleep apnea. The pathophysiology of sleep apnea, diagnostic testing (HST vs PSG), treatment options (PAP, oral appliance, surgery, hypoglossal nerve stimulator called Inspire), and supportive management (weight loss, positional therapy, smoking cessation, avoidance of alcohol and sedatives) were discussed with the patient in detail. Risk factors of sleep apnea as well as cardiometabolic and neurocognitive sequelae associated with untreated sleep apnea were also discussed. Lastly, patient was advised to avoid driving vehicle or operating heavy machinery when sleepy.     Trini Sanchez with the following problems:     # SLEEP DISORDERED BREATHING:  -This is likely sleep apnea based on the the history and physical examination.   -Trini Sanchez has not yet had a sleep study.  -Instruct patient to complete a split night sleep study.  -We consider treatment as indicated when testing is complete.     # CHRONIC SLEEP ONSET/ SLEEP MAINTENANCE INSOMNIA:  -likely due to poor sleep hygiene, untreated sleep apnea, nocturia, and Restless Legs Syndrome.  -Sleep hygiene discuss in the clinic.    # DEPRESSION and ANXIETY:   -Trini Sanchez is taking Hydroxyzine at night and doing well.  -Denies HI/SI     # HYPERTENSION/ CHF:  -Blood pressure was 159/99 today. To control the blood pressure better, instruct the patient to take anti-hypertension medication at bedtime and a water pill in the waking time.  -Denies headache, palpitation, and syncope in the clinic.  -Last Echo: The  left ventricular systolic function is normal, with a visually estimated ejection fraction of 60-65% on 11/29/2024   -Follows with PCP/ Cardiology     # MORBID OBESITY:  -with a BMI of 40.5. Trini Sanchez most recent Bicarbonate was 26  CARBON DIOXIDE   Date Value Ref Range Status   04/22/2025 26 20 - 32 mmol/L Final   -Encourage to have regular exercise to manage weight well.    # NASAL CONGESTION:  -Instruct Trini Sharif use appropriate Flonase spray to ease congestion.    # XEROSTOMIA:  -Instruct Trini Sharif purchase the Biotene gel to ease the dry mouth symptom.     # TOBACCO ABUSE:Trini Sanchez is a current 1 PPD smoker for  13 years.  -Smoking Cessation given.  -Nicotine patch ordered.    # RESTLESS LEG SYNDROME: 2/week  IRON, TOTAL (mcg/dL)   Date Value   04/22/2025 53     Iron (ug/dL)   Date Value   05/26/2023 22 (L)   12/03/2021 72     % SATURATION (% (calc))   Date Value   04/22/2025 18     Iron Saturation (%)   Date Value   05/26/2023 10 (L)   12/03/2021 22 (L)     IRON BINDING CAPACITY (mcg/dL (calc))   Date Value   04/22/2025 288     TIBC (ug/dL)   Date Value   05/26/2023 214 (L)   12/03/2021 321     FERRITIN (ng/mL)   Date Value   04/22/2025 119     Ferritin (ug/L)   Date Value   05/26/2023 540 (H)   -will reevaluate it after study/ treatment      RTC 3 months      All of patient's questions were answered. She verbalizes understanding and agreement with my assessment and plan.    Please excuse any errors in grammar or translation related to dictation. Thanks.         [1]   Allergies  Allergen Reactions    Aspirin Itching    Nsaids (Non-Steroidal Anti-Inflammatory Drug) Unknown   [2]   Current Outpatient Medications   Medication Sig Dispense Refill    albuterol 90 mcg/actuation inhaler Inhale 2 puffs every 4 hours if needed for wheezing. 18 g 11    alcohol swabs pads, medicated USE TO CLEAN SKIN PRIOR TO INSULIN INJECTION OR BLOOD GLUCOSE CHECK 4 TIMES DAILY 100 each 11    allopurinol  (Zyloprim) 100 mg tablet TAKE 1 TABLET BY MOUTH EVERY DAY 30 tablet 10    amLODIPine (Norvasc) 10 mg tablet Take 1 tablet (10 mg) by mouth once daily. 90 tablet 3    atorvastatin (Lipitor) 10 mg tablet Take 1 tablet (10 mg) by mouth once daily. 30 tablet 5    blood-glucose meter misc Use to check blood sugars 1 each 0    calcitriol (Rocaltrol) 0.25 mcg capsule Take 1 capsule (0.25 mcg) by mouth every other day. 45 capsule 3    carvedilol (Coreg) 25 mg tablet Take 1 tablet (25 mg) by mouth 2 times a day. 180 tablet 3    clindamycin (Cleocin T) 1 % lotion APPLY A THIN LAYER TOPICALLY TO THE AFFECTED AREA ONCE IN THE MORING      cloNIDine (Catapres-TTS) 0.1 mg/24 hr patch Place 1 patch on the skin 1 (one) time per week. Apply one patch on the skin and replace every 7 days, as directed 4 patch 12    cyclobenzaprine (Flexeril) 10 mg tablet Take 1 tablet (10 mg) by mouth 2 times a day. 60 tablet 3    dulaglutide (Trulicity) 3 mg/0.5 mL injection Inject 3 mg under the skin 1 (one) time per week. 2 mL 11    DULoxetine (Cymbalta) 60 mg DR capsule Take 1 capsule (60 mg) by mouth once daily. Do not crush or chew. 90 capsule 1    Farxiga 10 mg TAKE 1 TABLET BY MOUTH EVERY DAY IN THE MORNING 90 tablet 3    finerenone (Kerendia) 10 mg tablet tablet Take 1 tablet (10 mg) by mouth once daily. 90 tablet 3    fluticasone (Flonase) 50 mcg/actuation nasal spray Administer 1 spray into each nostril once daily. 16 g 3    FreeStyle Jessica 3 [place] misc Use as instructed 1 each 0    FreeStyle Jessica 3 Sensor device USE TO TEST BLOOD SUGAR AS DIRECTED; CHANGE SENSOR EVERY 14 DAYS 6 each 1    furosemide (Lasix) 40 mg tablet Take 1 tablet (40 mg) by mouth 2 times daily (morning and late afternoon). 180 tablet 3    glucagon 3 mg/actuation spray,non-aerosol USE 1 SPRAY INTRANASALLY AS NEEDED FOR BLOOD SUGAR <70 1 each 3    medroxyPROGESTERone 150 mg/mL injection Inject 1 mL (150 mg) into the muscle every 3 months. 1 mL 0    OneTouch Delica Plus  "Lancet 33 gauge misc USE TO CHECK GLUCOSE 4 TIMES A DAY      OneTouch Ultra Test strip CHECK BLOOD SUGARS 4 TIMES DAILY 400 strip 2    pregabalin (Lyrica) 75 mg capsule Take 1 tablet by mouth every 12 hours.      Sure Comfort Pen Needle 32 gauge x 5/32\" needle 1 EACH BEFORE MEALS AND AT BEDTIME      traZODone (Desyrel) 100 mg tablet Take 1 tablet (100 mg) by mouth as needed at bedtime for sleep. 90 tablet 1    valsartan (Diovan) 320 mg tablet Take 1 tablet (320 mg) by mouth once daily. 90 tablet 3     No current facility-administered medications for this visit.     "

## 2025-05-07 ENCOUNTER — OFFICE VISIT (OUTPATIENT)
Dept: SLEEP MEDICINE | Facility: HOSPITAL | Age: 45
End: 2025-05-07
Payer: COMMERCIAL

## 2025-05-07 VITALS
SYSTOLIC BLOOD PRESSURE: 159 MMHG | HEART RATE: 103 BPM | TEMPERATURE: 96.5 F | OXYGEN SATURATION: 97 % | BODY MASS INDEX: 40.5 KG/M2 | DIASTOLIC BLOOD PRESSURE: 99 MMHG | WEIGHT: 250.9 LBS

## 2025-05-07 DIAGNOSIS — I50.33 ACUTE ON CHRONIC HEART FAILURE WITH PRESERVED EJECTION FRACTION: ICD-10-CM

## 2025-05-07 DIAGNOSIS — F17.200 TOBACCO DEPENDENCE SYNDROME: ICD-10-CM

## 2025-05-07 DIAGNOSIS — I10 PRIMARY HYPERTENSION: ICD-10-CM

## 2025-05-07 DIAGNOSIS — I82.431 DEEP VEIN THROMBOSIS (DVT) OF POPLITEAL VEIN OF RIGHT LOWER EXTREMITY, UNSPECIFIED CHRONICITY: ICD-10-CM

## 2025-05-07 DIAGNOSIS — G47.00 INSOMNIA, UNSPECIFIED TYPE: ICD-10-CM

## 2025-05-07 DIAGNOSIS — G25.81 RLS (RESTLESS LEGS SYNDROME): ICD-10-CM

## 2025-05-07 DIAGNOSIS — E66.01 MORBID OBESITY (MULTI): ICD-10-CM

## 2025-05-07 DIAGNOSIS — E11.69 TYPE 2 DIABETES MELLITUS WITH OTHER SPECIFIED COMPLICATION, UNSPECIFIED WHETHER LONG TERM INSULIN USE (MULTI): ICD-10-CM

## 2025-05-07 DIAGNOSIS — G47.30 SLEEP-RELATED BREATHING DISORDER: Primary | ICD-10-CM

## 2025-05-07 DIAGNOSIS — F41.8 DEPRESSION WITH ANXIETY: ICD-10-CM

## 2025-05-07 PROBLEM — Z72.0 TOBACCO ABUSE: Status: RESOLVED | Noted: 2025-04-22 | Resolved: 2025-05-07

## 2025-05-07 PROCEDURE — 99213 OFFICE O/P EST LOW 20 MIN: CPT

## 2025-05-07 PROCEDURE — 3080F DIAST BP >= 90 MM HG: CPT

## 2025-05-07 PROCEDURE — 3077F SYST BP >= 140 MM HG: CPT

## 2025-05-07 PROCEDURE — 4010F ACE/ARB THERAPY RXD/TAKEN: CPT

## 2025-05-07 PROCEDURE — 99203 OFFICE O/P NEW LOW 30 MIN: CPT

## 2025-05-07 RX ORDER — IBUPROFEN 200 MG
1 TABLET ORAL ONCE
Status: DISCONTINUED | OUTPATIENT
Start: 2025-05-07 | End: 2025-05-07

## 2025-05-07 RX ORDER — IBUPROFEN 200 MG
1 TABLET ORAL DAILY
Status: SHIPPED | OUTPATIENT
Start: 2025-05-07 | End: 2025-07-02

## 2025-05-07 ASSESSMENT — LIFESTYLE VARIABLES
HOW OFTEN DO YOU HAVE SIX OR MORE DRINKS ON ONE OCCASION: LESS THAN MONTHLY
HOW OFTEN DO YOU HAVE A DRINK CONTAINING ALCOHOL: MONTHLY OR LESS
SKIP TO QUESTIONS 9-10: 0
HOW MANY STANDARD DRINKS CONTAINING ALCOHOL DO YOU HAVE ON A TYPICAL DAY: 1 OR 2
AUDIT-C TOTAL SCORE: 2

## 2025-05-07 ASSESSMENT — SLEEP AND FATIGUE QUESTIONNAIRES
SITING INACTIVE IN A PUBLIC PLACE LIKE A CLASS ROOM OR A MOVIE THEATER: WOULD NEVER DOZE
HOW LIKELY ARE YOU TO NOD OFF OR FALL ASLEEP IN A CAR, WHILE STOPPED FOR A FEW MINUTES IN TRAFFIC: WOULD NEVER DOZE
DIFFICULTY_FALLING_ASLEEP: SEVERE
HOW LIKELY ARE YOU TO NOD OFF OR FALL ASLEEP WHILE LYING DOWN TO REST IN THE AFTERNOON WHEN CIRCUMSTANCES PERMIT: SLIGHT CHANCE OF DOZING
ESS-CHAD TOTAL SCORE: 6
WORRIED_DISTRESSED_DUE_TO_SLEEP: MUCH
WAKING_TOO_EARLY: VERY SEVERE
SATISFACTION_WITH_CURRENT_SLEEP_PATTERN: DISSATISFIED
HOW LIKELY ARE YOU TO NOD OFF OR FALL ASLEEP WHILE SITTING AND READING: SLIGHT CHANCE OF DOZING
HOW LIKELY ARE YOU TO NOD OFF OR FALL ASLEEP WHILE SITTING AND TALKING TO SOMEONE: SLIGHT CHANCE OF DOZING
HOW LIKELY ARE YOU TO NOD OFF OR FALL ASLEEP WHILE WATCHING TV: SLIGHT CHANCE OF DOZING
HOW LIKELY ARE YOU TO NOD OFF OR FALL ASLEEP WHEN YOU ARE A PASSENGER IN A CAR FOR AN HOUR WITHOUT A BREAK: MODERATE CHANCE OF DOZING
DIFFICULTY_STAYING_ASLEEP: VERY SEVERE
SLEEP_PROBLEM_NOTICEABLE_TO_OTHERS: MUCH
HOW LIKELY ARE YOU TO NOD OFF OR FALL ASLEEP WHILE SITTING QUIETLY AFTER LUNCH WITHOUT ALCOHOL: WOULD NEVER DOZE
SLEEP_PROBLEM_INTERFERES_DAILY_ACTIVITIES: VERY MUCH NOTICEABLE

## 2025-05-07 ASSESSMENT — PAIN SCALES - GENERAL: PAINLEVEL_OUTOF10: 0-NO PAIN

## 2025-05-07 NOTE — PATIENT INSTRUCTIONS
[facility] Sleep Medicine  [facility] [place]  [address]  [facility] [address] [phone]       Thank you for coming to the Sleep Medicine Clinic today! Your sleep medicine provider today was: DION Siu Below is a summary of your treatment plan, patient education, other important information, and our contact numbers.    Dear Ms Trini Sanchez       Your Sleep Provider Today: DION Siu  Your Primary Care Physician: Alia Walker MD   Your Referring Provider: Alia Walker MD      Thank you for visiting  Sleep Medicine Clinic !     1. We will proceed with a SPLIT NIGHT sleep study to check your risk of sleep apnea. The lab will call you and schedule it for you.     2. Please do not drive when you are sleepy and start practicing the sleep hygiene as discussed in clinic.    3. Please start practicing the appropriate nasal spray at night to ease your nasal congestion as discussed in clinic today, and using Biotene to ease your dry mouth if needed.    4. Your blood pressure was elevated in the office today. Please obtain a home blood pressure monitoring kit, log your blood pressure at home, and follow up with your primary care physician. To control your blood pressure better, please take anti-hypertension medication at bedtime and a water pill at waking time.    5. FOR QUESTIONS AND CONCERNS:   a) :  To schedule, cancel, or reschedule SLEEP STUDY appointments, please call [phone]  b): Please call my office with issues or questions: [phone] ([place]); [phone] ([place]); [phone] (Ping)    If you have a CPAP or BiPAP machine at home, please bring the unit and all accessories including the power cord to your appointments unless I tell you otherwise. Please have knowledge of the DME company you worked with to receive your PAP device. If you have copies of any previous sleep testing completed  "outside of , please bring with you to clinic as well. This information will make our visits more productive.     If you are new to CPAP or BiPAP, please note the minimum usage insurance requires to continuing coverage for the equipment as noted by your DME company. Please discuss equipment issues (PAP unit, mask fit, humidification, etc.) with your DME company first.       In the event that you are running more than 10 minutes late to your appointment, I will kindly ask you to reschedule. Thanks.      TREATMENT PLAN     - Do the following testing: Split night sleep study  - Please read the \"Patient Education\" section below for more detailed information. Try implementing tips, reminders, strategies, and supportive management.   - If not yet done, please sign up for [employer] to make a future schedule, send prescription requests, or send messages.    Follow-up Appointment:   Follow-up in 3 months    PATIENT EDUCATION     OBSTRUCTIVE SLEEP APNEA (MIRA) is a sleep disorder where your upper airway muscles relax during sleep and the airway intermittently and repetitively narrows and collapses leading to partially blocked airway (hypopnea) or completely blocked airway (apnea) which, in turn, can disrupt breathing in sleep, lower oxygen levels while you sleep and cause night time wakings. Because both apnea and hypopnea may cause higher carbon dioxide or low oxygen levels, untreated MIRA can lead to heart arrhythmia, elevation of blood pressure, and make it harder for the body to consolidate memory and facilitate metabolism (leading to higher blood sugars at night). Frequent partial arousals occur during sleep resulting in sleep deprivation and daytime sleepiness. MIRA is associated with an increased risk of cardiovascular disease, stroke, hypertension, and insulin resistance. Moreover, untreated MIRA with excessive daytime sleepiness can increase the risk of motor vehicular accidents.    Below are conservative strategies for " MIRA regardless of MIRA severity are:   Positional therapy - Avoid sleeping on your back.   Healthy diet and regular exercise to optimize weight is highly encouraged.   Avoid alcohol late in the evening and sedative-hypnotics as these substances can make sleep apnea worse.   Improve breathing through the nose with intranasal steroid spray, saline rinse, or antihistamines    Safety: Avoid driving vehicle and operating heavy equipment while sleepy. Drowsy driving may lead to life-threatening motor vehicle accidents. A person driving while sleepy is 5 times more likely to have an accident. If you feel sleepy, pull over and take a short power nap (sleep for less than 30 minutes). Otherwise, ask somebody to drive you.    Treatment options for sleep apnea include weight management, positional therapy, Positive Airway Therapy (PAP) therapy, oral appliance therapy, hypoglossal nerve stimulator (Inspire) and select airway surgeries.    Sleep Testing for sleep apnea: The best and ideal way to check out if you have sleep apnea is to do an overnight sleep study in the sleep laboratory. Alternatively, a home sleep apnea test can also be done depending on your insurance and risk factors.     If you are having a home sleep apnea test, kindly allot 1 hour during pickup of the testing kit as you will have to complete paperwork and listen to the sleep technician for in-person on-the-spot demonstration and instructions on how to hook up the testing kit at home. Do the test for 1 day and start off with sleeping on your back. If you sleep on your side in the middle of night or you have always been a side or stomach-sleeper, it is ok as long as you have some time on your back and off-back.     If you are having an overnight in-lab sleep study, please make sure to bring toiletries, a comfy pillow, additional warm blankets, and any nighttime medications (include as-needed inhaler, pain pill, etc) that you may regularly take. Also, be sure to  eat dinner before you arrive as we generally do not provide meals inside the sleep testing center. Lastly, in order to fall asleep faster in the sleep testing center, we advise patients to wake up 2 hours earlier on the morning of scheduled testing and avoid napping 2 days prior testing. Sometimes, your sleep provider may prescribe a sleep aid to be taken at lights out in the sleep testing center. If you are taking a sleep aid, consider having somebody pick you up after the sleep testing.    Overnight sleep studies may be scheduled on a weekday or weekend. We also perform daytime testing for shift workers on a case-by-case basis.    Once you have booked an appointment to do the sleep study, please contact my office for follow-up visit to discuss results.    On the other hand, if you have any of the following, please consider calling the sleep testing center to RESCHEDULE your sleep study appointment:  If you tested positive for COVID within 10 days of your sleep study appointment.  If you were exposed to somebody who was confirmed for COVID within 10 days of your sleep study appointment and now you are having symptoms of possible COVID  If you have fever>100F or any acute symptoms that you think will lead to poor sleep during testing (e.g. new or worsening stuffy nose not relieved by steroid nasal spray)  If you have traveled domestically or internationally in the last month and now you are having symptoms of possible COVID    Trouble falling asleep or trouble staying asleep is called INSOMNIA and it can be caused by many different things such as untreated sleep apnea, anxiety, depression, stress, poor sleep habits, and other medical conditions or medications. The best way to treat insomnia is to treat the cause. In general, we can all benefit from better sleep hygiene (also known as good sleep habits). Below are recommendations to help you improve your sleep so you can fall asleep, stay asleep, and wake up feeling  "refreshed.    Keep a routine bedtime and rise time even on weekends and non-work days. This helps your biological clock stay in sync with your sleep needs. If you currently have variable sleep-wake schedule, start off by waking up and getting out of bed the same time every day, even on weekends or non-work days. Whether you have a good or poor sleep the night before, waking up at the same time every day can help re-train and reset your body clock.  Go to bed when you are sleepy and not when tired nor before your goal bedtime. Long periods of time in bed will lead to fragmented, shallow, broken sleep.   Use the bed for sleep and intimacy only. Do not watch TV, eat, read or use phone/laptop in bed. Keeping sleep as the only activity in bed will help re-associate bed equals sleep.  Get up when you cannot sleep in 15-20 minutes. When you are unable to sleep, exit the bed, and go to another room or chair in bedroom, do something boring, calm, relaxing, distracting, or non-stimulating in dim lighting until you feel sleepy enough to fall back asleep. Avoid stimulating activity or any triggers for mental thinking (e.g. cellphone). Repeat as needed.  Avoid napping during the day to build up sleep pressure so that it won't be hard for you to fall asleep at night. Napping, particularly in the late afternoon or early evening may interfere with your night's sleep. If naps are necessary, limit naps under 15-20 minutes and not later than 3 PM.   Create a \"buffer zone\" or \"wind-down time\". This is a quiet time prior to bedtime, typically at least 30 minutes and perhaps as long as 1-2 hours. During this time, you should do things that are enjoyable, relaxing, and not necessarily goal-oriented like performing relaxation exercises, listening to relaxing music, reading a boring book, dimming the lights, or eating a light bedtime snack like a glass of milk and banana which can promote sleep. Activities that promote relaxation before " "sleep is important because these can help quiet the mind and relax the body to get into the right state for sleep.   Do not worry or plan in bed. If you are worrying, planning, feeling anxious, or cannot shut off your thoughts, get up and stay out of bed until you have quieted your mind. Set up a “scheduled worry time” in the morning or late afternoon to write down your worries and stressors as well as plans for the following day.   Keep your bedroom quiet, dark, and cool. Ideal sleeping temperature is 65F. If light bothers you, get a slumber mask or blackout shades. If noise bothers you, put ear plugs or get a white noise machine. Alternatively, you may turn on fan or humidifier to create a constant background noise to eliminate unexpected sounds that would otherwise wake you up in the middle of your sleep.  Keep your bedroom technology free. Avoid exposure to TV and electronics 1-2 hours prior to bedtime. May also consider wearing \"blue light blocker\" eyeglasses.  Turn the clock around to avoid clock-watching. Thoughts of the time can cause frustration and make it more difficult to go to sleep.   Other things to avoid to help   Avoid  caffeine (including chocolate) intake in the late afternoon and early evening. Limit the amount of caffeine and consume before noon.   Avoid smoking 2-3 hours before bedtime. Like caffeine, nicotine in cigarette smoking acts a stimulant.   Avoid alcohol intake 2-3 hours before bedtime. Although alcohol may seem to help you fall asleep more easily as it slows down brain activity, it also disrupts your sleep during the night by causing frequent awakenings as the effect of alcohol wears off. Additionally, alcohol worsens snoring and sleep apnea  Avoid eating a heavy meal (high-fat, high-carbohydrate, gas-producing foods) at dinner and 2-3 hours before bedtime.    Avoid drinking more than 8 oz liquids in the evening. Limit fluid intake at 8 oz during dinner. Restrict fluids after dinner. " May take sips of water enough to swallow bedtime medications. Void at bedtime.  Avoid physical exercise or hot shower/bath within 2 hours of bedtime. Regular exercise can improve sleep quality, but exercising or having a hot shower/bath too close to bedtime can disrupt sleep onset. The best time to exercise to help sleep is in the late afternoon or early evening but not within 2 hours of bedtime. In order to promote sleep, hot shower/bath can be taken in the evening for 15-20 minutes but not within 2 hours of going to bed.   Avoid sleeping with pets or kids if they appear to bother your sleep and/or sleep quality.  Last but the least, DO NOT TRY TOO HARD TO SLEEP. JUST ALLOW SLEEP TO UNFOLD.    MOST IMPORTANTLY:  BE PATIENT!  BE CONSISTENT!  Your sleep problem developed over time so it will take some time and consistency to return to a more normal sleep pattern. By following the suggestions listed above, you should see gradual sleep improvements over time.    Also you have been recommended to do Cognitive Behavioral Therapy for Insomnia (CBT-I). CBT-I is widely recognized as an effective treatment for a wide range of insomnias. CBT-I is typically made up of a number of components including assessment, behavioral and cognitive interventions, motivational techniques, and relapse prevention skills. An overwhelming amount of evidence suggests that CBT-I is as effective as hypnotics and the newer non-benzodiazepine sleep aides in the acute treatment and is more effective than medication in the long term treatment of insomnia.     Below are some resources for CBT-I:  To see a Behavioral Sleep Medicine specialist for one-on-one counseling  CBT-I at the [facility] - Dr. Felix Guerra PsyD or Dr. Cristal Balderas, PhD - Phone: [phone]  Fax: [phone]. There may be a several month waiting period.  CBT-I at [facility] - Abby Rebolledo PsyD (Call [phone] and speak with Minda Mariee  Fax: [phone] or  backup fax: [phone])  Dr. Britt Chamorro - [email or web address]  - She only does telemedicine. She was formerly part of  but is in private practice and would be out-of-network  Dr. Martinez - one on one CBT-I service [email or web address]. You may have to pay out of pocket and submit visits to your insurance for reimbursement.  Other BSM providers in [place]:  https://www.behavioralsleep.org/index.php/directory/browse-by/state?value=[place]    2. If doing CBTi using an online platform, there are several online platforms that are good resources, but may vary based on cost. These include:  Anis[employer]andra- online course via [facility]. Self-guided. One time charge to sign up: Albert ($40)  The [employer] - online guided cognitive behavioral therapy https://www.[email or web address].[email or web address]/pricing ($300 for 8 weeks)  Sleepio - https://www.Kunsh[employer] Ribo[employer] Pharm[employer] Technologyio.com/sleepio/welcomeusint/354#1/1 (Some insurances or employers may cover - check with your HR Benefits office)  SleepEZ- Free self-guided course from the [facility] https://www.veterantraining.va.gov/insomnia/    You can also go to the following EDUCATION WEBSITES for further information:   American Academy of Sleep Medicine http://sleepeducation.org  National Sleep Foundation: https://sleepfoundation.org  American Sleep Apnea Association: https://www.sleepapnea.org (for patients with sleep apnea)  Narcolepsy Network: https://www.narcolepsynetwork.org (for patients with narcolepsy)  WakeUp[employer]arcole[employer]: https://www.wakeupnarcolepsy.org (for patients with narcolepsy)  Hypersomnia Foundation: https://www.hypersomniafoundation.org (for patients with idiopathic hypersomnia)  RLS foundation: https://www.rls.org (for patients with restless leg syndrome)    IMPORTANT INFORMATION     Call 911 for medical emergencies.  Our offices are generally open from Monday-Friday, 8 am - 5 pm.   There are no supporting services by either the sleep doctors or their staff on weekends and Holidays, or after 5 PM on  weekdays.   If you need to get in touch with me, you may either call my office number or you can use Xenith.  If a referral for a test, for CPAP, or for another specialist was made, and you have not heard about scheduling this within a week, please call scheduling at [phone] ([phone]).  If you are unable to make your appointment for clinic or an overnight study, kindly call the office or sleep testing center at least 48 hours in advance to cancel and reschedule.  If you are on CPAP, please bring your device's card and/or the device to each clinic appointment.   In case of problems with PAP machine or mask interface, please contact your [employer] (Durable Medical Equipment) company first. DME is the company who provides you the machine and/or PAP supplies.       PRESCRIPTIONS     We require 7 days advanced notice for prescription refills. If we do not receive the request in this time, we cannot guarantee that your medication will be refilled in time.    IMPORTANT PHONE NUMBERS     Sleep Medicine Clinic Fax: [phone]  Appointments (for Pediatric Sleep Clinic): [phone] - option 1  Appointments (for Adult Sleep Clinic): [phone] - option 2  Appointments (For Sleep Studies): [phone] - option 3  Behavioral Sleep Medicine: [phone]  Sleep Surgery: [phone]  Nutrition Service: [phone]  Weight management clinics with endocrinology: [phone]  Bariatric Services: [phone] (includes weight loss medications and weight loss surgery)  [facility]: [phone] (offers holistic approaches to weight management)  ENT (Otolaryngology): [phone]  Headache Clinic (Neurology): [phone]  Neurology: [phone]  Psychiatry: [phone]  Pulmonary Function Testing (PFT) Center: [phone]  Pulmonary Medicine: [phone]  Medical Service Company ([employer]): [phone]      OUR SLEEP TESTING LOCATIONS     Our team will contact you to schedule your sleep  "study, however, you can contact us as follow:  Main Phone Line (scheduling only): [phone] ([phone]), option 3  Adult and Pediatric Locations   [place] (6 years and older): [facility] - 4th floor ([address] [facility]) After hours line: [phone]  [facility] (Main campus: All ages): [facility], 6th floor. After hours line: [phone]   [place] (5 years and older; younger considered on case-by-case basis): [address]; [address], [address]. Scheduling  After hours line: [phone]   [place] (6 years and older): [address]; [address]; [address]   [place] (6 years and older): [address] [facility], [address]  After hours line: [phone]   [place] (13 years and older) in [place]: [address], 2nd floor  After hours line: [phone]   [place] (13 year and older): [address]  After hours line: [phone]     Adult Only Locations:   [place] (18 years and older): [address] [facility], 2nd floor   [place] (18 years and older): [address] [facility]; 4th floor  After hours line: [phone]   [place] (18 years and older) at [place]: [address] [facility]  After hours line: [phone]     CONTACTING YOUR SLEEP MEDICINE PROVIDER AND SLEEP TEAM      For issues with your machine or mask interface, please call your DME provider first. DME stands for durable medical company. DME is the company who provides you the machine and/or PAP supplies / accessories.   To schedule, cancel, or reschedule SLEEP STUDY APPOINTMENTS, please call the Main Phone Line at [phone] - option 3.   To schedule, cancel, or reschedule CLINIC APPOINTMENTS, you can do it in \"MyChart\", call [phone] to speak with my  (Joslyn Flanagan), or call the Main Phone Line at [phone] ([phone]) - option 2  For CLINICAL QUESTIONS or MEDICATION REFILLS, please call direct line for " "Adult Sleep Nurses at [phone].   Lastly, you can also send a message directly to your provider through \"My Chart\", which is the email service through your  Records Account: [email or web address]       Here at [facility], we wish you a restful sleep!   "

## 2025-05-08 ENCOUNTER — APPOINTMENT (OUTPATIENT)
Dept: ORTHOPEDIC SURGERY | Facility: CLINIC | Age: 45
End: 2025-05-08
Payer: COMMERCIAL

## 2025-05-09 DIAGNOSIS — E11.69 TYPE 2 DIABETES MELLITUS WITH OTHER SPECIFIED COMPLICATION, UNSPECIFIED WHETHER LONG TERM INSULIN USE (MULTI): ICD-10-CM

## 2025-05-09 RX ORDER — BLOOD SUGAR DIAGNOSTIC
STRIP MISCELLANEOUS
Qty: 100 STRIP | Refills: 3 | Status: SHIPPED | OUTPATIENT
Start: 2025-05-09

## 2025-05-12 ENCOUNTER — APPOINTMENT (OUTPATIENT)
Dept: ENDOCRINOLOGY | Facility: CLINIC | Age: 45
End: 2025-05-12
Payer: COMMERCIAL

## 2025-05-12 VITALS — HEIGHT: 66 IN | WEIGHT: 250 LBS | BODY MASS INDEX: 40.18 KG/M2

## 2025-05-12 DIAGNOSIS — E78.2 MIXED HYPERLIPIDEMIA: ICD-10-CM

## 2025-05-12 DIAGNOSIS — E11.69 TYPE 2 DIABETES MELLITUS WITH OTHER SPECIFIED COMPLICATION, UNSPECIFIED WHETHER LONG TERM INSULIN USE (MULTI): ICD-10-CM

## 2025-05-12 DIAGNOSIS — E66.813 CLASS 3 SEVERE OBESITY DUE TO EXCESS CALORIES WITH SERIOUS COMORBIDITY AND BODY MASS INDEX (BMI) OF 40.0 TO 44.9 IN ADULT: ICD-10-CM

## 2025-05-12 DIAGNOSIS — N18.31 STAGE 3A CHRONIC KIDNEY DISEASE (MULTI): ICD-10-CM

## 2025-05-12 DIAGNOSIS — R06.02 SOBOE (SHORTNESS OF BREATH ON EXERTION): ICD-10-CM

## 2025-05-12 DIAGNOSIS — I50.9 CONGESTIVE HEART FAILURE, UNSPECIFIED HF CHRONICITY, UNSPECIFIED HEART FAILURE TYPE: ICD-10-CM

## 2025-05-12 DIAGNOSIS — E11.69 TYPE 2 DIABETES MELLITUS WITH OTHER SPECIFIED COMPLICATION, WITHOUT LONG-TERM CURRENT USE OF INSULIN: Primary | ICD-10-CM

## 2025-05-12 DIAGNOSIS — E55.9 VITAMIN D DEFICIENCY: ICD-10-CM

## 2025-05-12 DIAGNOSIS — N18.32 STAGE 3B CHRONIC KIDNEY DISEASE (MULTI): ICD-10-CM

## 2025-05-12 DIAGNOSIS — I15.1 HYPERTENSION SECONDARY TO OTHER RENAL DISORDERS: ICD-10-CM

## 2025-05-12 PROCEDURE — 4010F ACE/ARB THERAPY RXD/TAKEN: CPT | Performed by: NURSE PRACTITIONER

## 2025-05-12 PROCEDURE — 3008F BODY MASS INDEX DOCD: CPT | Performed by: NURSE PRACTITIONER

## 2025-05-12 PROCEDURE — 95251 CONT GLUC MNTR ANALYSIS I&R: CPT | Performed by: NURSE PRACTITIONER

## 2025-05-12 PROCEDURE — 99214 OFFICE O/P EST MOD 30 MIN: CPT | Performed by: NURSE PRACTITIONER

## 2025-05-12 RX ORDER — DAPAGLIFLOZIN 10 MG/1
10 TABLET, FILM COATED ORAL DAILY
Qty: 30 TABLET | Refills: 11 | Status: SHIPPED | OUTPATIENT
Start: 2025-05-12 | End: 2026-05-12

## 2025-05-12 RX ORDER — DULAGLUTIDE 4.5 MG/.5ML
4.5 INJECTION, SOLUTION SUBCUTANEOUS WEEKLY
Qty: 2 ML | Refills: 11 | Status: SHIPPED | OUTPATIENT
Start: 2025-05-12

## 2025-05-12 RX ORDER — BLOOD-GLUCOSE SENSOR
EACH MISCELLANEOUS
Qty: 2 EACH | Refills: 11 | Status: SHIPPED | OUTPATIENT
Start: 2025-05-12

## 2025-05-12 ASSESSMENT — ENCOUNTER SYMPTOMS
OCCASIONAL FEELINGS OF UNSTEADINESS: 0
FATIGUE: 0
DIZZINESS: 0
DIARRHEA: 0
LOSS OF SENSATION IN FEET: 1
WEAKNESS: 0
NUMBNESS: 0
FREQUENCY: 0
SEIZURES: 0
NERVOUS/ANXIOUS: 0
NAUSEA: 0
SHORTNESS OF BREATH: 0
PALPITATIONS: 0
SLEEP DISTURBANCE: 0
DEPRESSION: 1
CONSTIPATION: 0
APPETITE CHANGE: 0
POLYDIPSIA: 0
POLYPHAGIA: 0
ACTIVITY CHANGE: 0

## 2025-05-12 ASSESSMENT — PATIENT HEALTH QUESTIONNAIRE - PHQ9
8. MOVING OR SPEAKING SO SLOWLY THAT OTHER PEOPLE COULD HAVE NOTICED. OR THE OPPOSITE, BEING SO FIGETY OR RESTLESS THAT YOU HAVE BEEN MOVING AROUND A LOT MORE THAN USUAL: SEVERAL DAYS
9. THOUGHTS THAT YOU WOULD BE BETTER OFF DEAD, OR OF HURTING YOURSELF: NOT AT ALL
5. POOR APPETITE OR OVEREATING: SEVERAL DAYS
10. IF YOU CHECKED OFF ANY PROBLEMS, HOW DIFFICULT HAVE THESE PROBLEMS MADE IT FOR YOU TO DO YOUR WORK, TAKE CARE OF THINGS AT HOME, OR GET ALONG WITH OTHER PEOPLE: SOMEWHAT DIFFICULT
3. TROUBLE FALLING OR STAYING ASLEEP OR SLEEPING TOO MUCH: NEARLY EVERY DAY
1. LITTLE INTEREST OR PLEASURE IN DOING THINGS: NOT AT ALL
SUM OF ALL RESPONSES TO PHQ9 QUESTIONS 1 AND 2: 3
4. FEELING TIRED OR HAVING LITTLE ENERGY: NEARLY EVERY DAY
SUM OF ALL RESPONSES TO PHQ QUESTIONS 1-9: 12
6. FEELING BAD ABOUT YOURSELF - OR THAT YOU ARE A FAILURE OR HAVE LET YOURSELF OR YOUR FAMILY DOWN: SEVERAL DAYS
2. FEELING DOWN, DEPRESSED OR HOPELESS: NEARLY EVERY DAY
7. TROUBLE CONCENTRATING ON THINGS, SUCH AS READING THE NEWSPAPER OR WATCHING TELEVISION: NOT AT ALL

## 2025-05-12 NOTE — PROGRESS NOTES
"Subjective   Trini Sanchez is a 44 y.o. female who presents for a follow up visit for evaluation of Type 2 diabetes mellitus. The initial diagnosis of diabetes was made May 2023.     Known complications due to diabetes included none    Cardiovascular risk factors include diabetes mellitus, hypertension, obesity (BMI >= 30 kg/m2), and smoking/ tobacco exposure.     States she has had issues with both feet in last year: States she has a wound on the bottom of her right foot since Dec 2024. States she is being followed by podiatry not associated with . Has follow up appointment today.     The patient is on an ACE inhibitor or angiotensin II receptor blocker.      Current diabetes regimen is as follows:   Farxiga 10 mg daily  Trulicity 3 mg weekly    The patient is currently checking the blood glucose 4-6 times per day.  Patient is using: continuous glucose monitor: Jessica 3      Hypoglycemia frequency: none  Hypoglycemia awareness: Yes     Exercise: intermittently   Meal panning: She is using avoidance of concentrated sweets.    Review of Systems   Constitutional:  Negative for activity change, appetite change and fatigue.   Respiratory:  Negative for shortness of breath.    Cardiovascular:  Negative for chest pain, palpitations and leg swelling.   Gastrointestinal:  Negative for constipation, diarrhea and nausea.   Endocrine: Negative for cold intolerance, heat intolerance, polydipsia, polyphagia and polyuria.   Genitourinary:  Negative for frequency.   Musculoskeletal:  Negative for gait problem.   Skin:  Negative for rash.        States she has a wound on bottom of right foot as per above.    Neurological:  Negative for dizziness, seizures, weakness and numbness.   Psychiatric/Behavioral:  Negative for sleep disturbance and suicidal ideas. The patient is not nervous/anxious.      Objective   Ht 1.676 m (5' 6\")   Wt 113 kg (250 lb)   LMP  (LMP Unknown)   BMI 40.35 kg/m²   Physical Exam  Constitutional:       " Appearance: She is obese.   Pulmonary:      Effort: Pulmonary effort is normal.   Neurological:      Mental Status: She is alert and oriented to person, place, and time.   Psychiatric:         Mood and Affect: Mood normal.         Behavior: Behavior normal.         Thought Content: Thought content normal.         Judgment: Judgment normal.       Lab Review  GLUCOSE (mg/dL)   Date Value   04/22/2025 157 (H)     Glucose (mg/dL)   Date Value   09/18/2024 105 (H)   04/08/2024 166 (H)   05/30/2023 232 (H)   05/29/2023 240 (H)   05/28/2023 266 (H)     Hemoglobin A1C (%)   Date Value   09/18/2024 6.8 (H)   04/08/2024 7.1 (H)   02/28/2024 9.2 (H)   05/26/2023 19.5 (A)   11/23/2022 8.5 (A)   02/16/2022 7.7 (A)     CARBON DIOXIDE (mmol/L)   Date Value   04/22/2025 26     Bicarbonate (mmol/L)   Date Value   09/18/2024 27   04/08/2024 25   05/30/2023 23   05/29/2023 26   05/28/2023 27     UREA NITROGEN (BUN) (mg/dL)   Date Value   04/22/2025 25     Urea Nitrogen (mg/dL)   Date Value   09/18/2024 30 (H)   04/08/2024 37 (H)   05/30/2023 14   05/29/2023 20   05/28/2023 31 (H)     Creatinine (mg/dL)   Date Value   09/18/2024 2.33 (H)   04/08/2024 2.86 (H)   05/30/2023 1.67 (H)   05/29/2023 1.95 (H)   05/28/2023 2.18 (H)     CREATININE (mg/dL)   Date Value   04/22/2025 1.88 (H)     Health Maintenance:   Foot Exam: Every 2 weeks for foot ulcer.   Eye Exam: Referred for exam today.   Lipid Panel: Total 125 LDL 64 April 2024. Updated labs ordered.   GFR 33: April 2025. She is now being followed by nephrology.     Assessment/Plan   Diagnoses and all orders for this visit:  Type 2 diabetes mellitus with other specified complication, without long-term current use of insulin  Stage 3a chronic kidney disease (Multi)  Class 3 severe obesity due to excess calories with serious comorbidity and body mass index (BMI) of 40.0 to 44.9 in adult  Hypertension secondary to other renal disorders  Mixed hyperlipidemia  Stage 3b chronic kidney disease  (Multi)    Type 2 diabetes mellitus, is not at goal, GMI 7%. Downloaded and interrogated Jessica continuous glucose sensor. Patient average glucose 154 mg/dl with time in range of  mg/dl 77%, 181-250 mg/dl 22%, greater than 250 mg/dl 1% with CV 24.3%. Fasting glucose still above target of less than 110 mg/dl.     RX changes:   INCREASE TRULICITY to 4.5 mg weekly   CONTINUE FARXIGA 10 mg daily  STOP Jessica 3, start JESSICA 3+. You can use the same sergio  Education:  interpretation of lab results, blood sugar goals, complications of diabetes mellitus, and nutrition  Hypertension: Managed by primary care and nephrology No changes.   Hyperlipidemia: At goal. Taking. Atorvastatin 10 mg daily.   Stage 3B CKD: Now being followed by nephrology.   BMI 39: Instructed to increase whole fruits and vegetables and reduce packaged and processed foods.   CKD stage 3B. Discussed urgency in blood pressure and glucose control. Instructed to follow up with nephrology and cardiology.   Follow up: I recommend diabetes care be 6 months.

## 2025-05-12 NOTE — PATIENT INSTRUCTIONS
Type 2 diabetes mellitus, is not at goal, GMI 7%. Downloaded and interrogated Jessica continuous glucose sensor. Patient average glucose 154 mg/dl with time in range of  mg/dl 77%, 181-250 mg/dl 22%, greater than 250 mg/dl 1% with CV 24.3%. Fasting glucose still above target of less than 110 mg/dl.     RX changes:   INCREASE TRULICITY to 4.5 mg weekly   CONTINUE FARXIGA 10 mg daily  STOP Jessica 3, start JESSICA 3+. You can use the same sergio  Education:  interpretation of lab results, blood sugar goals, complications of diabetes mellitus, and nutrition  Hypertension: Managed by primary care and nephrology No changes.   Hyperlipidemia: At goal. Taking. Atorvastatin 10 mg daily.   Stage 3B CKD: Now being followed by nephrology.   BMI 39: Instructed to increase whole fruits and vegetables and reduce packaged and processed foods.   CKD stage 3B. Discussed urgency in blood pressure and glucose control. Instructed to follow up with nephrology and cardiology.   Follow up: I recommend diabetes care be 6 months.

## 2025-05-13 ENCOUNTER — TELEPHONE (OUTPATIENT)
Dept: ENDOCRINOLOGY | Facility: CLINIC | Age: 45
End: 2025-05-13

## 2025-05-13 ENCOUNTER — PATIENT MESSAGE (OUTPATIENT)
Dept: SLEEP MEDICINE | Facility: HOSPITAL | Age: 45
End: 2025-05-13

## 2025-05-13 ENCOUNTER — APPOINTMENT (OUTPATIENT)
Dept: NEPHROLOGY | Facility: CLINIC | Age: 45
End: 2025-05-13
Payer: COMMERCIAL

## 2025-05-13 VITALS
DIASTOLIC BLOOD PRESSURE: 116 MMHG | HEIGHT: 66 IN | WEIGHT: 250.4 LBS | SYSTOLIC BLOOD PRESSURE: 170 MMHG | HEART RATE: 96 BPM | BODY MASS INDEX: 40.24 KG/M2

## 2025-05-13 DIAGNOSIS — I50.33 ACUTE ON CHRONIC HEART FAILURE WITH PRESERVED EJECTION FRACTION: ICD-10-CM

## 2025-05-13 DIAGNOSIS — Z72.0 TOBACCO ABUSE: ICD-10-CM

## 2025-05-13 DIAGNOSIS — E21.3 HYPERPARATHYROIDISM (MULTI): Primary | ICD-10-CM

## 2025-05-13 DIAGNOSIS — F17.200 TOBACCO DEPENDENCE SYNDROME: ICD-10-CM

## 2025-05-13 DIAGNOSIS — I10 PRIMARY HYPERTENSION: ICD-10-CM

## 2025-05-13 DIAGNOSIS — N18.4 CKD (CHRONIC KIDNEY DISEASE) STAGE 4, GFR 15-29 ML/MIN (MULTI): ICD-10-CM

## 2025-05-13 DIAGNOSIS — E11.69 TYPE 2 DIABETES MELLITUS WITH OTHER SPECIFIED COMPLICATION, WITHOUT LONG-TERM CURRENT USE OF INSULIN: ICD-10-CM

## 2025-05-13 PROCEDURE — 4010F ACE/ARB THERAPY RXD/TAKEN: CPT | Performed by: INTERNAL MEDICINE

## 2025-05-13 PROCEDURE — 99214 OFFICE O/P EST MOD 30 MIN: CPT | Performed by: INTERNAL MEDICINE

## 2025-05-13 PROCEDURE — 3080F DIAST BP >= 90 MM HG: CPT | Performed by: INTERNAL MEDICINE

## 2025-05-13 PROCEDURE — 3077F SYST BP >= 140 MM HG: CPT | Performed by: INTERNAL MEDICINE

## 2025-05-13 PROCEDURE — 3008F BODY MASS INDEX DOCD: CPT | Performed by: INTERNAL MEDICINE

## 2025-05-13 RX ORDER — CARVEDILOL 25 MG/1
25 TABLET ORAL 2 TIMES DAILY
Qty: 180 TABLET | Refills: 1 | Status: SHIPPED | OUTPATIENT
Start: 2025-05-13

## 2025-05-13 RX ORDER — CHLORTHALIDONE 25 MG/1
25 TABLET ORAL DAILY
Qty: 90 TABLET | Refills: 3 | Status: SHIPPED | OUTPATIENT
Start: 2025-05-13 | End: 2026-05-13

## 2025-05-13 RX ORDER — ISOPROPYL ALCOHOL 70 ML/100ML
SWAB TOPICAL
Qty: 100 EACH | Refills: 3 | Status: SHIPPED | OUTPATIENT
Start: 2025-05-13

## 2025-05-13 RX ORDER — ALBUTEROL SULFATE 90 UG/1
2 INHALANT RESPIRATORY (INHALATION) EVERY 4 HOURS PRN
Qty: 8.5 G | Refills: 11 | Status: SHIPPED | OUTPATIENT
Start: 2025-05-13

## 2025-05-13 RX ORDER — CALCITRIOL 0.25 UG/1
0.25 CAPSULE ORAL EVERY OTHER DAY
Qty: 45 CAPSULE | Refills: 2 | Status: SHIPPED | OUTPATIENT
Start: 2025-05-13

## 2025-05-13 NOTE — TELEPHONE ENCOUNTER
Patient left message on phone that she needed a new reader for the new Jessica 3+ sensors. I spoke with patient this morning to inform her that she can still use the reader she was using for the Jessica.

## 2025-05-13 NOTE — PROGRESS NOTES
Trini Sanchez   44 y.o.    @@  [facility]/Room: [id number]/Room/bed info not found    Subjective:   The patient is being seen for a routine clinic follow-up of chronic kidney disease. Recently, the disease has been stable. Disease complications:  No hyperkalemia, no hypocalcemia, no hyperphosphatemia, no metabolic acidosis, no coagulopathy, no uremic encephalopathy, no neuropathy and no renal osteodystrophy. The patient is currently asymptomatic. No associated symptoms are reported.       Meds:   Current Medications[1]       ROS:  The patient is awake and oriented. No dizziness or lightheadedness. No chills and no fever. No headaches. No nausea and no vomiting. No shortness of breath. No cough. No chest pain. No abdominal pain. No diarrhea. No hematemesis or hemoptysis. No hematuria. No rectal bleeding. No melena. No epistaxis. No urinary symptoms. No flank pain. No leg edema. No itching. Overall, the rest of the review of systems is also negative.  12 point review of systems otherwise negative as stated in HPI.        Physical Examination:        Vitals:    05/13/25 1530   BP: (!) 170/116   Pulse: 96     General: The patient is awake, oriented, and is not in any distress.  Head and Neck: Normocephalic. No periorbital edema.  Eyes: non-icteric  Respiratory: Symmetric chest expansion. No respiratory distress.  Skin: No maculopapular rash.  Musculoskeletal: No peripheral edema.  Neuro Exam: Speech is fluent. Moves extremities.    Imaging:  === 04/04/25 ===    BI US BREAST LIMITED RIGHT    - Impression -  Persistent residual recurrent/chronic subareolar abscesses. Clinical  follow-up is recommended. Imaging follow-up could be obtained as  clinically warranted following treatment to assess for resolution.    BI-RADS CATEGORY:  BI-RADS Category:  2 Benign.  Recommendation:  Clinical follow-up.  Recommended Date:  At the discretion of the ordering provider.  Laterality:  Right.    For any future breast imaging appointments,  please call [phone] ([phone]).    I personally reviewed the images/study and I agree with the findings  as stated by Marcio Hi MD (resident) . This study was  interpreted at [facility],  [place], [place].    MACRO:  None    Signed by: Kevin Nogueiramark anthony 4/4/2025 2:50 PM  Dictation workstation:   [id number]       Blood Labs:  No results found for this or any previous visit (from the past 24 hours).   Lab Results   Component Value Date    PTH 82.5 04/08/2024    PROTUR NEGATIVE 05/26/2023    PROTUR CANCELED 05/26/2023    PROTUR 4 (L) 02/17/2023    PHOS 3.0 05/30/2023      Lab Results   Component Value Date    GLUCOSE 157 (H) 04/22/2025    CALCIUM 9.2 04/22/2025     04/22/2025    K 4.3 04/22/2025    CO2 26 04/22/2025     04/22/2025    BUN 25 04/22/2025    CREATININE 1.88 (H) 04/22/2025         Assessment and Plan:  1. Chronic kidney disease is stage IV. Her last Cr level is 1.88.  No major change in kidney function.  Normal K and Bicarb level.     2. Hypertension. Blood pressure is higher than the goal.  She is on multiple antihypertensive medications but sometimes she missed taking her medications.  We talked about low-salt diet.  For some reason she is not on chlorthalidone anymore.  I sent a new prescription for chlorthalidone.    3. Congestive heart failure.     4. proteinuria. Her last spot urine protein to creatinine ratio showed about 0.32 gram proteinuria. She is on valsartan and Farxiga.  Spot urine protein to creatinine ratio will be checked before the next appointment.     I will see her in about 4 months for follow-up.           Lucho Abdul MD  Senior Attending Physician  Director of Onco-Nephrology Program  Division of Nephrology & Hypertension  [facility]       [1]   Current Outpatient Medications   Medication Sig Dispense Refill    albuterol 90 mcg/actuation inhaler INHALE 2 PUFFS INTO THE LUNGS EVERY 4 HOURS  AS NEEDED FOR WHEEZING 8.5 g 11    alcohol swabs (Alcohol Prep Pads) USE TO CLEAN SKIN PRIOR TO INSULIN INJECTION OR BLOOD GLUCOSE CHECKS FOUR TIMES A  each 3    allopurinol (Zyloprim) 100 mg tablet TAKE 1 TABLET BY MOUTH EVERY DAY 30 tablet 10    amLODIPine (Norvasc) 10 mg tablet Take 1 tablet (10 mg) by mouth once daily. 90 tablet 3    atorvastatin (Lipitor) 10 mg tablet Take 1 tablet (10 mg) by mouth once daily. 30 tablet 5    blood-glucose meter misc Use to check blood sugars 1 each 0    blood-glucose sensor (FreeStyle Jessica 3 Plus Sensor) device Use as directed to measure glucose changing every 15 days 2 each 11    calcitriol (Rocaltrol) 0.25 mcg capsule TAKE 1 CAPSULE BY MOUTH EVERY OTHER DAY 45 capsule 2    carvedilol (Coreg) 25 mg tablet TAKE ONE (1) TABLET BY MOUTH TWICE DAILY 180 tablet 1    clindamycin (Cleocin T) 1 % lotion APPLY A THIN LAYER TOPICALLY TO THE AFFECTED AREA ONCE IN THE MORING      cloNIDine (Catapres-TTS) 0.1 mg/24 hr patch Place 1 patch on the skin 1 (one) time per week. Apply one patch on the skin and replace every 7 days, as directed 4 patch 12    cyclobenzaprine (Flexeril) 10 mg tablet Take 1 tablet (10 mg) by mouth 2 times a day. 60 tablet 3    dapagliflozin propanediol (Farxiga) 10 mg tablet Take 1 tablet (10 mg) by mouth once daily. 30 tablet 11    dulaglutide (Trulicity) 4.5 mg/0.5 mL pen injector Inject 4.5 mg under the skin 1 (one) time per week. 2 mL 11    DULoxetine (Cymbalta) 60 mg DR capsule Take 1 capsule (60 mg) by mouth once daily. Do not crush or chew. 90 capsule 1    finerenone (Kerendia) 10 mg tablet tablet Take 1 tablet (10 mg) by mouth once daily. 90 tablet 3    fluticasone (Flonase) 50 mcg/actuation nasal spray Administer 1 spray into each nostril once daily. 16 g 3    furosemide (Lasix) 40 mg tablet Take 1 tablet (40 mg) by mouth 2 times daily (morning and late afternoon). 180 tablet 3    glucagon 3 mg/actuation spray,non-aerosol USE 1 SPRAY INTRANASALLY AS  NEEDED FOR BLOOD SUGAR <70 1 each 3    medroxyPROGESTERone 150 mg/mL injection Inject 1 mL (150 mg) into the muscle every 3 months. 1 mL 0    OneTouch Delica Plus Lancet 33 gauge misc USE TO CHECK GLUCOSE 4 TIMES A DAY      OneTouch Ultra Test USE TO TEST BLOOD SUGAR FOUR TIMES A  strip 3    pregabalin (Lyrica) 75 mg capsule Take 1 tablet by mouth every 12 hours.      traZODone (Desyrel) 100 mg tablet Take 1 tablet (100 mg) by mouth as needed at bedtime for sleep. 90 tablet 1    valsartan (Diovan) 320 mg tablet Take 1 tablet (320 mg) by mouth once daily. 90 tablet 3     Current Facility-Administered Medications   Medication Dose Route Frequency Provider Last Rate Last Admin    nicotine (Nicoderm CQ) 21 mg/24 hr patch 1 patch  1 patch transdermal Daily Cori Devlin, APRN-CNP

## 2025-05-14 RX ORDER — IBUPROFEN 200 MG
1 TABLET ORAL EVERY 24 HOURS
Qty: 30 PATCH | Refills: 0 | Status: SHIPPED | OUTPATIENT
Start: 2025-05-14 | End: 2025-06-13

## 2025-06-08 DIAGNOSIS — M06.4 INFLAMMATORY POLYARTHROPATHY (MULTI): ICD-10-CM

## 2025-06-09 RX ORDER — CYCLOBENZAPRINE HCL 10 MG
10 TABLET ORAL 2 TIMES DAILY
Qty: 60 TABLET | Refills: 3 | Status: SHIPPED | OUTPATIENT
Start: 2025-06-09 | End: 2025-10-07

## 2025-06-10 ENCOUNTER — APPOINTMENT (OUTPATIENT)
Dept: OPHTHALMOLOGY | Facility: CLINIC | Age: 45
End: 2025-06-10
Payer: COMMERCIAL

## 2025-06-11 DIAGNOSIS — E11.69 TYPE 2 DIABETES MELLITUS WITH OTHER SPECIFIED COMPLICATION, UNSPECIFIED WHETHER LONG TERM INSULIN USE (MULTI): ICD-10-CM

## 2025-06-11 NOTE — TELEPHONE ENCOUNTER
Pharmacy requesting a True metrix meter kit, test strips and lancet. Patient insurance preferred glucometer as of 6/1/25

## 2025-06-12 RX ORDER — ISOPROPYL ALCOHOL 70 ML/100ML
SWAB TOPICAL
Qty: 100 EACH | Refills: 3 | Status: SHIPPED | OUTPATIENT
Start: 2025-06-12

## 2025-06-12 RX ORDER — LANCETS
EACH MISCELLANEOUS
Qty: 100 EACH | Refills: 3 | Status: SHIPPED | OUTPATIENT
Start: 2025-06-12

## 2025-06-12 RX ORDER — CALCIUM CITRATE/VITAMIN D3 200MG-6.25
TABLET ORAL
Qty: 100 EACH | Refills: 3 | Status: SHIPPED | OUTPATIENT
Start: 2025-06-12

## 2025-06-12 RX ORDER — INSULIN PUMP SYRINGE, 3 ML
EACH MISCELLANEOUS
Qty: 1 EACH | Refills: 0 | Status: SHIPPED | OUTPATIENT
Start: 2025-06-12 | End: 2026-06-11

## 2025-06-24 ENCOUNTER — TELEPHONE (OUTPATIENT)
Facility: CLINIC | Age: 45
End: 2025-06-24

## 2025-06-25 ENCOUNTER — TELEPHONE (OUTPATIENT)
Dept: OBSTETRICS AND GYNECOLOGY | Facility: HOSPITAL | Age: 45
End: 2025-06-25
Payer: COMMERCIAL

## 2025-06-25 DIAGNOSIS — Z30.019 ENCOUNTER FOR FEMALE BIRTH CONTROL: ICD-10-CM

## 2025-06-25 RX ORDER — MEDROXYPROGESTERONE ACETATE 150 MG/ML
150 INJECTION, SUSPENSION INTRAMUSCULAR
Qty: 1 ML | Refills: 2 | Status: SHIPPED | OUTPATIENT
Start: 2025-06-25

## 2025-06-25 NOTE — TELEPHONE ENCOUNTER
Called patient and verified by name and . Per Dr. Nunes wanted to ask patient if sexually active so she could prescribe depo. Asked patient if she was sexually active and patient stated no. Will pend and send order to Dr. Nunes for depo. Patient had no further questions or concerns.    Nicci SIMPSON

## 2025-06-27 ENCOUNTER — TRANSCRIBE ORDERS (OUTPATIENT)
Dept: ORTHOPEDIC SURGERY | Facility: HOSPITAL | Age: 45
End: 2025-06-27
Payer: COMMERCIAL

## 2025-06-27 DIAGNOSIS — M54.50 LOW BACK PAIN, UNSPECIFIED BACK PAIN LATERALITY, UNSPECIFIED CHRONICITY, UNSPECIFIED WHETHER SCIATICA PRESENT: ICD-10-CM

## 2025-06-28 DIAGNOSIS — G47.00 INSOMNIA, UNSPECIFIED TYPE: ICD-10-CM

## 2025-07-01 ENCOUNTER — OFFICE VISIT (OUTPATIENT)
Dept: ORTHOPEDIC SURGERY | Facility: CLINIC | Age: 45
End: 2025-07-01
Payer: COMMERCIAL

## 2025-07-01 ENCOUNTER — HOSPITAL ENCOUNTER (OUTPATIENT)
Dept: RADIOLOGY | Facility: CLINIC | Age: 45
Discharge: HOME | End: 2025-07-01
Payer: COMMERCIAL

## 2025-07-01 DIAGNOSIS — M48.062 LUMBAR STENOSIS WITH NEUROGENIC CLAUDICATION: ICD-10-CM

## 2025-07-01 DIAGNOSIS — M54.16 LUMBAR RADICULOPATHY: Primary | ICD-10-CM

## 2025-07-01 DIAGNOSIS — M54.50 LOW BACK PAIN, UNSPECIFIED BACK PAIN LATERALITY, UNSPECIFIED CHRONICITY, UNSPECIFIED WHETHER SCIATICA PRESENT: ICD-10-CM

## 2025-07-01 DIAGNOSIS — M43.10 ACQUIRED SPONDYLOLISTHESIS: ICD-10-CM

## 2025-07-01 PROCEDURE — 72110 X-RAY EXAM L-2 SPINE 4/>VWS: CPT | Performed by: RADIOLOGY

## 2025-07-01 PROCEDURE — 99203 OFFICE O/P NEW LOW 30 MIN: CPT | Performed by: ORTHOPAEDIC SURGERY

## 2025-07-01 PROCEDURE — 4010F ACE/ARB THERAPY RXD/TAKEN: CPT | Performed by: ORTHOPAEDIC SURGERY

## 2025-07-01 PROCEDURE — 99212 OFFICE O/P EST SF 10 MIN: CPT | Performed by: ORTHOPAEDIC SURGERY

## 2025-07-01 PROCEDURE — 72110 X-RAY EXAM L-2 SPINE 4/>VWS: CPT

## 2025-07-01 RX ORDER — TRAZODONE HYDROCHLORIDE 100 MG/1
100 TABLET ORAL NIGHTLY PRN
Qty: 90 TABLET | Refills: 1 | Status: SHIPPED | OUTPATIENT
Start: 2025-07-01 | End: 2026-07-01

## 2025-07-01 ASSESSMENT — PAIN DESCRIPTION - DESCRIPTORS: DESCRIPTORS: BURNING

## 2025-07-01 ASSESSMENT — PAIN - FUNCTIONAL ASSESSMENT: PAIN_FUNCTIONAL_ASSESSMENT: 0-10

## 2025-07-01 ASSESSMENT — PAIN SCALES - GENERAL: PAINLEVEL_OUTOF10: 8

## 2025-07-01 NOTE — LETTER
July 3, 2025     Alia Walker MD  [id number]  [facility] [id number]    Patient: Trini Sanchez   YOB: 1980   Date of Visit: 7/1/2025       Dear Dr. Alia Walker MD:    Thank you for referring Trini Sanchez to me for evaluation. Below are my notes for this consultation.  If you have questions, please do not hesitate to call me. I look forward to following your patient along with you.       Sincerely,     José Campbell MD      CC: No Recipients  ______________________________________________________________________________________    HPI:Trini Sanchez is a 44-year-old woman who comes in with complaints of chronic low back pain with radiation to the bilateral buttocks and hips.  The pain then radiates into the legs left greater than right.  She describes claudication-like symptoms.  She has not had any physical therapy or other nonoperative treatment.      ROS:  Reviewed on EMR and patient intake sheet.    PMH/SH:  Reviewed on EMR and patient intake sheet.    Exam:  Physical Exam    Constitutional: Well appearing; no acute distress  Eyes: pupils are equal and round  Psych: normal affect  Respiratory: non-labored breathing  Cardiovascular: regular rate and rhythm  GI: non-distended abdomen  Musculoskeletal: no pain with range of motion of the hips bilaterally  Neurologic: [5]/5 strength in the lower extremities bilaterally]; [negative] straight leg raise    Radiology:     X-rays demonstrate a grade 1 spondylolisthesis at L3-4 on flexion films.  Multilevel spondylosis.    Diagnosis:    Neurogenic claudication secondary spinal stenosis and degenerative spondylolisthesis    Assessment and Plan:   44-year-old woman with claudication and radicular symptoms secondary to spinal stenosis and a degenerative spondylolisthesis.  At this time we will begin with physical therapy and core strengthening exercises.  If the symptoms persist and remain intolerable, then an MRI and follow-up would be  appropriate.  I will see her back at that time.    At the end of the visit, I asked the patient if there were any further questions.  Although no further questions were provided at this time, I would be happy to see the patient back in the future to discuss any further questions or concerns.      José Campbell MD    Chief of Spine Surgery, [facility]  Director of Spine Service, [facility]  , Department of Orthopaedics  [facility]  [address] [place] [address]  P: [phone]  [facility]    This note was dictated with voice recognition software.  It has not been proofread for grammatical errors, typographical mistakes or other semantic inconsistencies.

## 2025-07-03 NOTE — PROGRESS NOTES
HPI:Trini Sanchez is a 44-year-old woman who comes in with complaints of chronic low back pain with radiation to the bilateral buttocks and hips.  The pain then radiates into the legs left greater than right.  She describes claudication-like symptoms.  She has not had any physical therapy or other nonoperative treatment.      ROS:  Reviewed on EMR and patient intake sheet.    PMH/SH:  Reviewed on EMR and patient intake sheet.    Exam:  Physical Exam    Constitutional: Well appearing; no acute distress  Eyes: pupils are equal and round  Psych: normal affect  Respiratory: non-labored breathing  Cardiovascular: regular rate and rhythm  GI: non-distended abdomen  Musculoskeletal: no pain with range of motion of the hips bilaterally  Neurologic: [5]/5 strength in the lower extremities bilaterally]; [negative] straight leg raise    Radiology:     X-rays demonstrate a grade 1 spondylolisthesis at L3-4 on flexion films.  Multilevel spondylosis.    Diagnosis:    Neurogenic claudication secondary spinal stenosis and degenerative spondylolisthesis    Assessment and Plan:   44-year-old woman with claudication and radicular symptoms secondary to spinal stenosis and a degenerative spondylolisthesis.  At this time we will begin with physical therapy and core strengthening exercises.  If the symptoms persist and remain intolerable, then an MRI and follow-up would be appropriate.  I will see her back at that time.    At the end of the visit, I asked the patient if there were any further questions.  Although no further questions were provided at this time, I would be happy to see the patient back in the future to discuss any further questions or concerns.      José Campbell MD    Chief of Spine Surgery, [facility]  Director of Spine Service, [facility]  , Department of Orthopaedics  [facility]  [id number]  [place] [address]  P: [phone]  [facility]    This note was dictated with voice recognition software.  It has not been proofread for grammatical errors, typographical mistakes or other semantic inconsistencies.

## 2025-07-08 ENCOUNTER — APPOINTMENT (OUTPATIENT)
Dept: OPHTHALMOLOGY | Facility: CLINIC | Age: 45
End: 2025-07-08
Payer: COMMERCIAL

## 2025-07-08 DIAGNOSIS — H35.033 HYPERTENSIVE RETINOPATHY OF BOTH EYES: ICD-10-CM

## 2025-07-08 DIAGNOSIS — E11.69 TYPE 2 DIABETES MELLITUS WITH OTHER SPECIFIED COMPLICATION, WITHOUT LONG-TERM CURRENT USE OF INSULIN: Primary | ICD-10-CM

## 2025-07-08 PROCEDURE — 92004 COMPRE OPH EXAM NEW PT 1/>: CPT

## 2025-07-08 PROCEDURE — 2023F DILAT RTA XM W/O RTNOPTHY: CPT

## 2025-07-08 PROCEDURE — 92134 CPTRZ OPH DX IMG PST SGM RTA: CPT

## 2025-07-08 ASSESSMENT — ENCOUNTER SYMPTOMS
ENDOCRINE NEGATIVE: 0
NEUROLOGICAL NEGATIVE: 0
HEMATOLOGIC/LYMPHATIC NEGATIVE: 0
CONSTITUTIONAL NEGATIVE: 0
RESPIRATORY NEGATIVE: 0
ALLERGIC/IMMUNOLOGIC NEGATIVE: 0
EYES NEGATIVE: 1
MUSCULOSKELETAL NEGATIVE: 0
GASTROINTESTINAL NEGATIVE: 0
CARDIOVASCULAR NEGATIVE: 0
PSYCHIATRIC NEGATIVE: 0

## 2025-07-08 ASSESSMENT — CONF VISUAL FIELD
OD_INFERIOR_TEMPORAL_RESTRICTION: 0
OD_NORMAL: 1
OD_SUPERIOR_NASAL_RESTRICTION: 0
OS_SUPERIOR_NASAL_RESTRICTION: 0
OD_SUPERIOR_TEMPORAL_RESTRICTION: 0
OS_INFERIOR_NASAL_RESTRICTION: 0
METHOD: COUNTING FINGERS
OS_SUPERIOR_TEMPORAL_RESTRICTION: 0
OD_INFERIOR_NASAL_RESTRICTION: 0
OS_INFERIOR_TEMPORAL_RESTRICTION: 0
OS_NORMAL: 1

## 2025-07-08 ASSESSMENT — TONOMETRY
OD_IOP_MMHG: 17
IOP_METHOD: GOLDMANN APPLANATION
OS_IOP_MMHG: 16

## 2025-07-08 ASSESSMENT — VISUAL ACUITY
OS_SC: 20/25
METHOD: SNELLEN - LINEAR
OD_SC+: -1
OD_SC: 20/30
OS_SC+: -2

## 2025-07-08 ASSESSMENT — CUP TO DISC RATIO
OS_RATIO: .3
OD_RATIO: .3

## 2025-07-08 ASSESSMENT — EXTERNAL EXAM - LEFT EYE: OS_EXAM: NORMAL

## 2025-07-08 ASSESSMENT — EXTERNAL EXAM - RIGHT EYE: OD_EXAM: NORMAL

## 2025-07-08 ASSESSMENT — SLIT LAMP EXAM - LIDS
COMMENTS: GOOD POSITION
COMMENTS: GOOD POSITION

## 2025-07-08 NOTE — PROGRESS NOTES
Diabetes, No retinopathy, both eyesE11.9  Hypertensive retinopathy, both eyesH35.033  - Hx of Diabetes: 3 years  - Last A1c = 6.8  - Hx of HTN  - Hx of heart failure  - Hx of Stage III CKD    - AV crossing changes, Grade II, tortious vessels    OCT 07/08/25     - Right eye (OD): Normal foveal contour, intact RPE, outer and inner retinal layers. No IRF or SRF    - Left eye (OS): Normal foveal contour, intact RPE  outer and inner retinal layers. No IRF or SRF    #Plan#:  - Stressed on importance of blood sugar and BP control  - Observe  - RTC in 12 months

## 2025-07-11 DIAGNOSIS — E11.69 TYPE 2 DIABETES MELLITUS WITH OTHER SPECIFIED COMPLICATION, WITHOUT LONG-TERM CURRENT USE OF INSULIN: ICD-10-CM

## 2025-07-11 DIAGNOSIS — E78.5 HYPERLIPIDEMIA, UNSPECIFIED HYPERLIPIDEMIA TYPE: ICD-10-CM

## 2025-07-14 DIAGNOSIS — E78.5 HYPERLIPIDEMIA, UNSPECIFIED HYPERLIPIDEMIA TYPE: ICD-10-CM

## 2025-07-14 RX ORDER — ATORVASTATIN CALCIUM 10 MG/1
10 TABLET, FILM COATED ORAL DAILY
Qty: 30 TABLET | Refills: 11 | OUTPATIENT
Start: 2025-07-14

## 2025-07-18 RX ORDER — ATORVASTATIN CALCIUM 10 MG/1
10 TABLET, FILM COATED ORAL DAILY
Qty: 30 TABLET | Refills: 5 | Status: SHIPPED | OUTPATIENT
Start: 2025-07-18

## 2025-07-18 RX ORDER — DULAGLUTIDE 3 MG/.5ML
INJECTION, SOLUTION SUBCUTANEOUS
Qty: 2 ML | Refills: 11 | OUTPATIENT
Start: 2025-07-18

## 2025-07-23 NOTE — HOSPITAL COURSE
Gynecologic Surgery History and Physical    Subjective   Trini Sanchez is a 45 y.o. with chronic pelvic pain presenting for EUA, pap, EMB.    PMH: CHF, DM, CKD, MIRA   PSH: tonsillectomy, laparoscopy (pt unsure why)     Obstetrical History   OB History    Para Term  AB Living   0 0 0      SAB IAB Ectopic Multiple Live Births              Past Medical History  Past Medical History:   Diagnosis Date    Adjustment disorder with depressed mood 2019    Dysfunctional grieving    CHF (congestive heart failure) 3 years    Chronic kidney disease [place]    Encounter for other preprocedural examination 2018    Pre-op testing    Furuncle of limb, unspecified 2018    Boil, axilla    Hypertension Chelsie 20    Insomnia Unsure    Pain in right finger(s) 2017    Pain of right thumb    Peripheral neuropathy Year    Personal history of diseases of the skin and subcutaneous tissue 2016    History of cellulitis    Snoring Unsure    Type 2 diabetes mellitus Unsure        Past Surgical History   Past Surgical History:   Procedure Laterality Date    LAPAROSCOPY DIAGNOSTIC / BIOPSY / ASPIRATION / LYSIS  2014    Exploratory Laparoscopy    TONSILLECTOMY  2014    Tonsillectomy With Adenoidectomy    TUBAL LIGATION  10/04/2016    Tubal Ligation       Social History  Social History     Tobacco Use    Smoking status: Every Day     Current packs/day: 1.00     Average packs/day: 1 pack/day for 17.0 years (17.0 ttl pk-yrs)     Types: Cigarettes     Passive exposure: Current    Smokeless tobacco: Never   Substance Use Topics    Alcohol use: Not Currently     Substance and Sexual Activity   Drug Use Yes    Frequency: 7.0 times per week    Types: Marijuana       Allergies  Aspirin and Nsaids (non-steroidal anti-inflammatory drug)     Medications  No medications prior to admission.       ROS: negative except per HPI    Objective    Last Vitals  There were no vitals taken for this visit.    Physical  Examination  General: no acute distress  HEENT: normocephalic, atraumatic  Heart: warm and well perfused  Lungs: breathing comfortably on room air  Abdomen: to be performed in OR  Extremities: moving all extremities  Neuro: awake and conversant  Psych: appropriate mood and affect    Lab Review          Lab Results   Component Value Date    WBC 10.0 04/22/2025    HGB 12.6 04/22/2025    HCT 38.2 04/22/2025    MCV 93.9 04/22/2025     04/22/2025       Lab Results   Component Value Date    GLUCOSE 157 (H) 04/22/2025    CALCIUM 9.2 04/22/2025     04/22/2025    K 4.3 04/22/2025    CO2 26 04/22/2025     04/22/2025    BUN 25 04/22/2025    CREATININE 1.88 (H) 04/22/2025       Assessment/Plan     Trini Sanchez is a 45 y.o. with chronic pelvic pain presenting for scheduled surgery.    Plan to proceed with EUA, pap smear, and EMB  Surgical consent was reviewed. The risks of surgery were discussed including: bleeding (including need for blood transfusion in life-threatening situations; risks of transfusion), infection, damage to surrounding organs. The patient had the opportunity to answer questions and desired to proceed with surgery following our discussion. Both verbal and written consents were obtained.    To be seen and discussed with Dr. Manju Badillo MD, PGY-2  GYN, [id number]

## 2025-07-24 ENCOUNTER — ANESTHESIA EVENT (OUTPATIENT)
Dept: OPERATING ROOM | Facility: HOSPITAL | Age: 45
End: 2025-07-24
Payer: COMMERCIAL

## 2025-07-25 ENCOUNTER — HOSPITAL ENCOUNTER (OUTPATIENT)
Facility: HOSPITAL | Age: 45
Setting detail: OUTPATIENT SURGERY
Discharge: HOME | End: 2025-07-25
Attending: STUDENT IN AN ORGANIZED HEALTH CARE EDUCATION/TRAINING PROGRAM | Admitting: STUDENT IN AN ORGANIZED HEALTH CARE EDUCATION/TRAINING PROGRAM
Payer: COMMERCIAL

## 2025-07-25 ENCOUNTER — APPOINTMENT (OUTPATIENT)
Dept: RADIOLOGY | Facility: CLINIC | Age: 45
End: 2025-07-25
Payer: COMMERCIAL

## 2025-07-25 ENCOUNTER — ANESTHESIA (OUTPATIENT)
Dept: OPERATING ROOM | Facility: HOSPITAL | Age: 45
End: 2025-07-25
Payer: COMMERCIAL

## 2025-07-25 ENCOUNTER — APPOINTMENT (OUTPATIENT)
Dept: ORTHOPEDIC SURGERY | Facility: CLINIC | Age: 45
End: 2025-07-25
Payer: COMMERCIAL

## 2025-07-25 VITALS
BODY MASS INDEX: 39.33 KG/M2 | WEIGHT: 244.71 LBS | SYSTOLIC BLOOD PRESSURE: 127 MMHG | HEART RATE: 77 BPM | DIASTOLIC BLOOD PRESSURE: 77 MMHG | TEMPERATURE: 97 F | OXYGEN SATURATION: 95 % | RESPIRATION RATE: 16 BRPM | HEIGHT: 66 IN

## 2025-07-25 DIAGNOSIS — M25.561 ACUTE BILATERAL KNEE PAIN: ICD-10-CM

## 2025-07-25 DIAGNOSIS — M19.90 OSTEOARTHRITIS, UNSPECIFIED OSTEOARTHRITIS TYPE, UNSPECIFIED SITE: ICD-10-CM

## 2025-07-25 DIAGNOSIS — R10.2 CHRONIC PELVIC PAIN IN FEMALE: ICD-10-CM

## 2025-07-25 DIAGNOSIS — M25.562 ACUTE BILATERAL KNEE PAIN: ICD-10-CM

## 2025-07-25 DIAGNOSIS — G89.29 CHRONIC PELVIC PAIN IN FEMALE: ICD-10-CM

## 2025-07-25 LAB
ABO GROUP (TYPE) IN BLOOD: NORMAL
ANTIBODY SCREEN: NORMAL
GLUCOSE BLD MANUAL STRIP-MCNC: 138 MG/DL (ref 74–99)
GLUCOSE BLD MANUAL STRIP-MCNC: 138 MG/DL (ref 74–99)
PREGNANCY TEST URINE, POC: NEGATIVE
RH FACTOR (ANTIGEN D): NORMAL

## 2025-07-25 PROCEDURE — 3600000002 HC OR TIME - INITIAL BASE CHARGE - PROCEDURE LEVEL TWO: Performed by: STUDENT IN AN ORGANIZED HEALTH CARE EDUCATION/TRAINING PROGRAM

## 2025-07-25 PROCEDURE — 2500000004 HC RX 250 GENERAL PHARMACY W/ HCPCS (ALT 636 FOR OP/ED): Mod: SE | Performed by: STUDENT IN AN ORGANIZED HEALTH CARE EDUCATION/TRAINING PROGRAM

## 2025-07-25 PROCEDURE — 86850 RBC ANTIBODY SCREEN: CPT | Performed by: ANESTHESIOLOGY

## 2025-07-25 PROCEDURE — 81025 URINE PREGNANCY TEST: CPT | Performed by: ANESTHESIOLOGY

## 2025-07-25 PROCEDURE — 3600000003 HC OR TIME - INITIAL BASE CHARGE - PROCEDURE LEVEL THREE: Performed by: STUDENT IN AN ORGANIZED HEALTH CARE EDUCATION/TRAINING PROGRAM

## 2025-07-25 PROCEDURE — 2500000005 HC RX 250 GENERAL PHARMACY W/O HCPCS: Mod: SE | Performed by: STUDENT IN AN ORGANIZED HEALTH CARE EDUCATION/TRAINING PROGRAM

## 2025-07-25 PROCEDURE — 88305 TISSUE EXAM BY PATHOLOGIST: CPT | Mod: TC,SUR | Performed by: STUDENT IN AN ORGANIZED HEALTH CARE EDUCATION/TRAINING PROGRAM

## 2025-07-25 PROCEDURE — 2500000004 HC RX 250 GENERAL PHARMACY W/ HCPCS (ALT 636 FOR OP/ED): Mod: JW,SE

## 2025-07-25 PROCEDURE — 7100000010 HC PHASE TWO TIME - EACH INCREMENTAL 1 MINUTE: Performed by: STUDENT IN AN ORGANIZED HEALTH CARE EDUCATION/TRAINING PROGRAM

## 2025-07-25 PROCEDURE — 7100000009 HC PHASE TWO TIME - INITIAL BASE CHARGE: Performed by: STUDENT IN AN ORGANIZED HEALTH CARE EDUCATION/TRAINING PROGRAM

## 2025-07-25 PROCEDURE — 3600000008 HC OR TIME - EACH INCREMENTAL 1 MINUTE - PROCEDURE LEVEL THREE: Performed by: STUDENT IN AN ORGANIZED HEALTH CARE EDUCATION/TRAINING PROGRAM

## 2025-07-25 PROCEDURE — 58100 BIOPSY OF UTERUS LINING: CPT | Performed by: STUDENT IN AN ORGANIZED HEALTH CARE EDUCATION/TRAINING PROGRAM

## 2025-07-25 PROCEDURE — 82947 ASSAY GLUCOSE BLOOD QUANT: CPT

## 2025-07-25 PROCEDURE — 3700000002 HC GENERAL ANESTHESIA TIME - EACH INCREMENTAL 1 MINUTE: Performed by: STUDENT IN AN ORGANIZED HEALTH CARE EDUCATION/TRAINING PROGRAM

## 2025-07-25 PROCEDURE — 3700000001 HC GENERAL ANESTHESIA TIME - INITIAL BASE CHARGE: Performed by: STUDENT IN AN ORGANIZED HEALTH CARE EDUCATION/TRAINING PROGRAM

## 2025-07-25 PROCEDURE — 36415 COLL VENOUS BLD VENIPUNCTURE: CPT | Performed by: ANESTHESIOLOGY

## 2025-07-25 PROCEDURE — 3600000007 HC OR TIME - EACH INCREMENTAL 1 MINUTE - PROCEDURE LEVEL TWO: Performed by: STUDENT IN AN ORGANIZED HEALTH CARE EDUCATION/TRAINING PROGRAM

## 2025-07-25 RX ORDER — MIDAZOLAM HYDROCHLORIDE 1 MG/ML
INJECTION, SOLUTION INTRAMUSCULAR; INTRAVENOUS AS NEEDED
Status: DISCONTINUED | OUTPATIENT
Start: 2025-07-25 | End: 2025-07-25

## 2025-07-25 RX ORDER — ACETAMINOPHEN 325 MG/1
650 TABLET ORAL EVERY 4 HOURS PRN
Status: DISCONTINUED | OUTPATIENT
Start: 2025-07-25 | End: 2025-07-25 | Stop reason: HOSPADM

## 2025-07-25 RX ORDER — LIDOCAINE HYDROCHLORIDE 10 MG/ML
0.1 INJECTION, SOLUTION EPIDURAL; INFILTRATION; INTRACAUDAL; PERINEURAL ONCE
Status: DISCONTINUED | OUTPATIENT
Start: 2025-07-25 | End: 2025-07-25 | Stop reason: HOSPADM

## 2025-07-25 RX ORDER — DEXMEDETOMIDINE HYDROCHLORIDE 100 UG/ML
INJECTION, SOLUTION INTRAVENOUS AS NEEDED
Status: DISCONTINUED | OUTPATIENT
Start: 2025-07-25 | End: 2025-07-25

## 2025-07-25 RX ORDER — LIDOCAINE HYDROCHLORIDE 10 MG/ML
INJECTION, SOLUTION INFILTRATION; PERINEURAL AS NEEDED
Status: DISCONTINUED | OUTPATIENT
Start: 2025-07-25 | End: 2025-07-25 | Stop reason: HOSPADM

## 2025-07-25 RX ORDER — MIDAZOLAM HYDROCHLORIDE 2 MG/2ML
INJECTION, SOLUTION INTRAMUSCULAR; INTRAVENOUS AS NEEDED
Status: DISCONTINUED | OUTPATIENT
Start: 2025-07-25 | End: 2025-07-25

## 2025-07-25 RX ORDER — SODIUM CHLORIDE 0.9 G/100ML
INJECTION, SOLUTION IRRIGATION AS NEEDED
Status: DISCONTINUED | OUTPATIENT
Start: 2025-07-25 | End: 2025-07-25 | Stop reason: HOSPADM

## 2025-07-25 RX ORDER — OXYCODONE HYDROCHLORIDE 5 MG/1
5 TABLET ORAL EVERY 4 HOURS PRN
Status: DISCONTINUED | OUTPATIENT
Start: 2025-07-25 | End: 2025-07-25 | Stop reason: HOSPADM

## 2025-07-25 RX ORDER — HYDRALAZINE HYDROCHLORIDE 20 MG/ML
5 INJECTION INTRAMUSCULAR; INTRAVENOUS ONCE AS NEEDED
Status: CANCELLED | OUTPATIENT
Start: 2025-07-25

## 2025-07-25 RX ORDER — HYDROMORPHONE HYDROCHLORIDE 0.2 MG/ML
0.1 INJECTION INTRAMUSCULAR; INTRAVENOUS; SUBCUTANEOUS EVERY 5 MIN PRN
Status: DISCONTINUED | OUTPATIENT
Start: 2025-07-25 | End: 2025-07-25 | Stop reason: HOSPADM

## 2025-07-25 RX ORDER — FENTANYL CITRATE 50 UG/ML
INJECTION, SOLUTION INTRAMUSCULAR; INTRAVENOUS AS NEEDED
Status: DISCONTINUED | OUTPATIENT
Start: 2025-07-25 | End: 2025-07-25

## 2025-07-25 RX ORDER — OXYCODONE HYDROCHLORIDE 5 MG/1
10 TABLET ORAL EVERY 4 HOURS PRN
Status: DISCONTINUED | OUTPATIENT
Start: 2025-07-25 | End: 2025-07-25 | Stop reason: HOSPADM

## 2025-07-25 RX ORDER — LABETALOL HYDROCHLORIDE 5 MG/ML
5 INJECTION, SOLUTION INTRAVENOUS EVERY 10 MIN PRN
Status: CANCELLED | OUTPATIENT
Start: 2025-07-25

## 2025-07-25 RX ORDER — PROCHLORPERAZINE EDISYLATE 5 MG/ML
5 INJECTION INTRAMUSCULAR; INTRAVENOUS ONCE AS NEEDED
Status: DISCONTINUED | OUTPATIENT
Start: 2025-07-25 | End: 2025-07-25 | Stop reason: HOSPADM

## 2025-07-25 RX ORDER — FENTANYL CITRATE 50 UG/ML
25 INJECTION, SOLUTION INTRAMUSCULAR; INTRAVENOUS EVERY 5 MIN PRN
Refills: 0 | Status: CANCELLED | OUTPATIENT
Start: 2025-07-25

## 2025-07-25 RX ORDER — PROPOFOL 10 MG/ML
INJECTION, EMULSION INTRAVENOUS AS NEEDED
Status: DISCONTINUED | OUTPATIENT
Start: 2025-07-25 | End: 2025-07-25

## 2025-07-25 RX ORDER — ALBUTEROL SULFATE 0.83 MG/ML
2.5 SOLUTION RESPIRATORY (INHALATION) ONCE AS NEEDED
Status: DISCONTINUED | OUTPATIENT
Start: 2025-07-25 | End: 2025-07-25 | Stop reason: HOSPADM

## 2025-07-25 RX ORDER — DEXMEDETOMIDINE HYDROCHLORIDE 4 UG/ML
INJECTION, SOLUTION INTRAVENOUS CONTINUOUS PRN
Status: DISCONTINUED | OUTPATIENT
Start: 2025-07-25 | End: 2025-07-25

## 2025-07-25 RX ORDER — HYDROMORPHONE HYDROCHLORIDE 0.2 MG/ML
0.2 INJECTION INTRAMUSCULAR; INTRAVENOUS; SUBCUTANEOUS EVERY 5 MIN PRN
Status: DISCONTINUED | OUTPATIENT
Start: 2025-07-25 | End: 2025-07-25 | Stop reason: HOSPADM

## 2025-07-25 RX ORDER — FENTANYL CITRATE 50 UG/ML
50 INJECTION, SOLUTION INTRAMUSCULAR; INTRAVENOUS EVERY 5 MIN PRN
Refills: 0 | Status: CANCELLED | OUTPATIENT
Start: 2025-07-25

## 2025-07-25 RX ORDER — ONDANSETRON HYDROCHLORIDE 2 MG/ML
4 INJECTION, SOLUTION INTRAVENOUS ONCE AS NEEDED
Status: DISCONTINUED | OUTPATIENT
Start: 2025-07-25 | End: 2025-07-25 | Stop reason: HOSPADM

## 2025-07-25 RX ORDER — SODIUM CHLORIDE, SODIUM LACTATE, POTASSIUM CHLORIDE, CALCIUM CHLORIDE 600; 310; 30; 20 MG/100ML; MG/100ML; MG/100ML; MG/100ML
100 INJECTION, SOLUTION INTRAVENOUS CONTINUOUS
Status: CANCELLED | OUTPATIENT
Start: 2025-07-25

## 2025-07-25 RX ADMIN — FENTANYL CITRATE 50 MCG: 50 INJECTION, SOLUTION INTRAMUSCULAR; INTRAVENOUS at 07:52

## 2025-07-25 RX ADMIN — DEXMEDETOMIDINE HYDROCHLORIDE 12 MCG: 100 INJECTION, SOLUTION INTRAVENOUS at 07:53

## 2025-07-25 RX ADMIN — DEXMEDETOMIDINE HYDROCHLORIDE 8 MCG: 100 INJECTION, SOLUTION INTRAVENOUS at 08:01

## 2025-07-25 RX ADMIN — PROPOFOL 20 MG: 10 INJECTION, EMULSION INTRAVENOUS at 08:01

## 2025-07-25 RX ADMIN — DEXMEDETOMIDINE HYDROCHLORIDE 12 MCG: 100 INJECTION, SOLUTION INTRAVENOUS at 07:58

## 2025-07-25 RX ADMIN — DEXMEDETOMIDINE HYDROCHLORIDE 0.6 MCG/KG/HR: 4 INJECTION, SOLUTION INTRAVENOUS at 07:53

## 2025-07-25 RX ADMIN — MIDAZOLAM HYDROCHLORIDE 2 MG: 2 INJECTION, SOLUTION INTRAMUSCULAR; INTRAVENOUS at 07:51

## 2025-07-25 RX ADMIN — PROPOFOL 30 MG: 10 INJECTION, EMULSION INTRAVENOUS at 07:53

## 2025-07-25 RX ADMIN — DEXMEDETOMIDINE HYDROCHLORIDE 8 MCG: 100 INJECTION, SOLUTION INTRAVENOUS at 07:51

## 2025-07-25 SDOH — HEALTH STABILITY: MENTAL HEALTH: CURRENT SMOKER: 1

## 2025-07-25 ASSESSMENT — COLUMBIA-SUICIDE SEVERITY RATING SCALE - C-SSRS
2. HAVE YOU ACTUALLY HAD ANY THOUGHTS OF KILLING YOURSELF?: NO
6. HAVE YOU EVER DONE ANYTHING, STARTED TO DO ANYTHING, OR PREPARED TO DO ANYTHING TO END YOUR LIFE?: NO
1. IN THE PAST MONTH, HAVE YOU WISHED YOU WERE DEAD OR WISHED YOU COULD GO TO SLEEP AND NOT WAKE UP?: NO

## 2025-07-25 ASSESSMENT — PAIN - FUNCTIONAL ASSESSMENT
PAIN_FUNCTIONAL_ASSESSMENT: 0-10

## 2025-07-25 ASSESSMENT — PAIN SCALES - GENERAL
PAINLEVEL_OUTOF10: 8
PAINLEVEL_OUTOF10: 0 - NO PAIN
PAIN_LEVEL: 0
PAINLEVEL_OUTOF10: 0 - NO PAIN
PAINLEVEL_OUTOF10: 0 - NO PAIN

## 2025-07-25 NOTE — H&P
Gynecologic Surgery History and Physical    Subjective   Trini Sanchez is a 45 y.o. with chronic pelvic pain presenting for EUA, pap, EMB in OR.    PMH: CHF, DM, CKD, MIRA   PSH: tonsillectomy, laparoscopy (pt unsure why)     Obstetrical History   OB History    Para Term  AB Living   0 0 0      SAB IAB Ectopic Multiple Live Births              Past Medical History  Past Medical History:   Diagnosis Date    Adjustment disorder with depressed mood 2019    Dysfunctional grieving    CHF (congestive heart failure) 3 years    Chronic kidney disease [place]    Encounter for other preprocedural examination 2018    Pre-op testing    Furuncle of limb, unspecified 2018    Boil, axilla    Hypertension Chelsie 20    Insomnia Unsure    Pain in right finger(s) 2017    Pain of right thumb    Peripheral neuropathy Year    Personal history of diseases of the skin and subcutaneous tissue 2016    History of cellulitis    Snoring Unsure    Type 2 diabetes mellitus Unsure        Past Surgical History   Past Surgical History:   Procedure Laterality Date    LAPAROSCOPY DIAGNOSTIC / BIOPSY / ASPIRATION / LYSIS  2014    Exploratory Laparoscopy    TONSILLECTOMY  2014    Tonsillectomy With Adenoidectomy    TUBAL LIGATION  10/04/2016    Tubal Ligation       Social History  Social History     Tobacco Use    Smoking status: Every Day     Current packs/day: 1.00     Average packs/day: 1 pack/day for 17.0 years (17.0 ttl pk-yrs)     Types: Cigarettes     Passive exposure: Current    Smokeless tobacco: Never   Substance Use Topics    Alcohol use: Not Currently     Substance and Sexual Activity   Drug Use Yes    Frequency: 7.0 times per week    Types: Marijuana       Allergies  Aspirin and Nsaids (non-steroidal anti-inflammatory drug)     Medications  No medications prior to admission.       ROS: negative except per HPI    Objective    Last Vitals  Vitals in chart were reviewed.     Physical  Examination  General: no acute distress  HEENT: normocephalic, atraumatic  Heart: warm and well perfused  Lungs: breathing comfortably on room air  Abdomen: to be performed in OR  Extremities: moving all extremities  Neuro: awake and conversant  Psych: appropriate mood and affect    Lab Review          Lab Results   Component Value Date    WBC 10.0 04/22/2025    HGB 12.6 04/22/2025    HCT 38.2 04/22/2025    MCV 93.9 04/22/2025     04/22/2025       Lab Results   Component Value Date    GLUCOSE 157 (H) 04/22/2025    CALCIUM 9.2 04/22/2025     04/22/2025    K 4.3 04/22/2025    CO2 26 04/22/2025     04/22/2025    BUN 25 04/22/2025    CREATININE 1.88 (H) 04/22/2025       Assessment/Plan     Trini Sanchez is a 45 y.o. with chronic pelvic pain presenting for scheduled surgery.    Plan to proceed with EUA, pap smear, and EMB in OR  Surgical consent was reviewed. The risks of surgery were discussed including: bleeding (including need for blood transfusion in life-threatening situations; risks of transfusion), infection, damage to surrounding organs. The patient had the opportunity to answer questions and desired to proceed with surgery following our discussion. Both verbal and written consents were obtained.    To be seen and discussed with Dr. Manju Badillo MD, PGY-2  GYN, [id number]

## 2025-07-25 NOTE — ANESTHESIA PREPROCEDURE EVALUATION
Patient: Trini Sanchez    Procedure Information       Anesthesia Start Date/Time: 07/25/25 0730    Procedure: SURGICAL EXPLORATION, GENITALIA, FEMALE    Location: [facility] OR 03 / [facility] OR    Surgeons: Agnes Nunes MD            Relevant Problems   Cardiac   (+) Acute on chronic heart failure with preserved ejection fraction   (+) CHF (congestive heart failure)   (+) Hyperlipidemia   (+) Hypertension      Pulmonary   (+) MIRA (obstructive sleep apnea)   (+) Pneumonia due to infectious organism   (+) SOBOE (shortness of breath on exertion)   (+) Streptococcal pneumonia      Neuro   (+) Cubital tunnel syndrome on right   (+) Depression with anxiety      /Renal   (+) Acute cystitis without hematuria   (+) Urinary tract infection without hematuria      Liver   (+) Fatty liver      Endocrine   (+) Morbid obesity (Multi)   (+) Type 2 diabetes mellitus with other specified complication      Hematology   (+) Deep vein thrombosis (DVT) of popliteal vein of right lower extremity      Musculoskeletal   (+) Degeneration of intervertebral disc of lumbar region   (+) Osteoarthritis      HEENT   (+) Sinusitis      ID   (+) Influenza   (+) Pneumonia due to infectious organism   (+) Streptococcal pneumonia   (+) Urinary tract infection without hematuria      GYN   (+) Abnormal uterine bleeding   (+) Endometriosis       Clinical information reviewed:   Tobacco  Allergies  Meds   Med Hx  Surg Hx  OB Status  Fam Hx  Soc   Hx        NPO Detail:  NPO/Void Status  Carbohydrate Drink Given Prior to Surgery? : N  Date of Last Liquid: 07/25/25  Time of Last Liquid: 0400 (sips with meds)  Date of Last Solid: 07/24/25  Time of Last Solid: 2000  Last Intake Type: Clear fluids  Time of Last Void: 0600         Physical Exam    Airway  Mallampati: IV  TM distance: <3 FB  Neck ROM: full  Mouth opening: 3 or more finger widths  Comments: Can bite upper lip     Cardiovascular   Rhythm: regular     Dental     (+) edentulous      Pulmonary Breath sounds clear to auscultation     Abdominal            Anesthesia Plan    History of general anesthesia?: yes  History of complications of general anesthesia?: no    ASA 3     MAC and general     The patient is a current smoker.    intravenous induction   Anesthetic plan and risks discussed with patient.  Use of blood products discussed with patient who consented to blood products.    Plan discussed with attending and resident.

## 2025-07-25 NOTE — ANESTHESIA POSTPROCEDURE EVALUATION
Patient: Trini Sanchez    Procedure Summary       Date: 07/25/25 Room / Location: [facility] OR 03 / [facility] OR    Anesthesia Start: 0730 Anesthesia Stop: 0825    Procedure: SURGICAL EXPLORATION, GENITALIA, FEMALE Diagnosis:       Chronic pelvic pain in female      (Chronic pelvic pain in female [R10.2, G89.29])    Surgeons: Agnes Nunes MD Responsible Provider: Caitlin Liu MD    Anesthesia Type: MAC ASA Status: 3            Anesthesia Type: No value filed.    Vitals Value Taken Time   /83 07/25/25 08:20   Temp 36.3 °C (97.3 °F) 07/25/25 08:18   Pulse 85 07/25/25 08:23   Resp 16 07/25/25 08:18   SpO2 96 % 07/25/25 08:23   Vitals shown include unfiled device data.    Anesthesia Post Evaluation    Patient location during evaluation: PACU  Patient participation: complete - patient participated  Level of consciousness: sleepy but conscious  Pain score: 0  Pain management: adequate  Airway patency: patent  Cardiovascular status: hemodynamically stable  Respiratory status: spontaneous ventilation and room air  Hydration status: acceptable  Postoperative Nausea and Vomiting: none        No notable events documented.

## 2025-07-25 NOTE — ADDENDUM NOTE
Addendum  created 07/25/25 0921 by Adin Norton MD    Intraprocedure Meds edited, Narcotic reconciliation edited

## 2025-07-25 NOTE — OP NOTE
Date: 2025  OR Location: [facility] OR    Name: Trini Sanchez, : 1980, Age: 45 y.o., MRN: [id number], Sex: female    Diagnosis  Pre-op Diagnosis      * Chronic pelvic pain in female [R10.2, G89.29] Post-op Diagnosis     * Chronic pelvic pain in female [R10.2, G89.29]     Procedures  SURGICAL EXPLORATION, GENITALIA, FEMALE  [id number] - TN BIOPSY OF UTERUS LINING      Surgeons      * Agnes Nunes - Primary    Resident/Fellow/Other Assistant:  Surgeons and Role:  * No surgeons found with a matching role *    Staff:   Circulator: Suzy Traore Person: Bhavani    Anesthesia Staff: Anesthesiologist: Caitlin Liu MD  Anesthesia Resident: Adin Norton MD    Procedure Summary  Anesthesia: Anesthesia type not filed in the log.  ASA: ASA status not filed in the log.  Estimated Blood Loss: 2mL  Intra-op Medications:   Administrations occurring from 0700 to 0945 on 25:   Medication Name Total Dose   sodium chloride 0.9 % irrigation solution 1,000 mL   lidocaine (Xylocaine) 10 mg/mL (1 %) injection 5 mL   dexmedeTOMIDine 4 mcg/mL in 100 mL NS infusion 25.9 mcg   dexmedeTOMIDine (Precedex) 100 mcg/mL 2 mL single dose vial 40 mcg   fentaNYL (Sublimaze) injection 50 mcg/mL 50 mcg   midazolam (Versed) 1 mg/1 mL 2 mg   propofol (Diprivan) injection 10 mg/mL 50 mg              Anesthesia Record               Intraprocedure I/O Totals          Intake    Dexmedetomidine 0.00 mL    The total shown is the total volume documented since Anesthesia Start was filed.    Total Intake 0 mL          Specimen:   ID Type Source Tests Collected by Time   1 : endometrial biopsy Tissue ENDOMETRIUM BIOPSY SURGICAL PATHOLOGY EXAM Agnes Nunes MD 2025 0822   A :   CERVIX, SCREENING  Agnes Nunes MD 2025 08                 Procedure Details:  The patient was seen in the preoperative area. The site of surgery was properly noted/marked if necessary per policy. The patient has been actively warmed in preoperative area. Preoperative  antibiotics are not indicated. Venous thrombosis prophylaxis are not indicated.    Findings: Normal external genitalia. Taut hymenal ring. Normal appearing cervix. Unremarkable bimanual exam.    Indication: Need for pap smear, EMB     Procedure: The patient was taken to the operating room where anesthesia was administered. She was then positioned in the dorsal lithotomy position and a bimanual exam was performed. She was prepped and draped in the normal sterile fashion. A metal speculum was placed in the vagina and a pap smear was performed. The anterior lip of the cervix was grasped with a single tooth tenaculum and a intracervical block was placed with 1% Lidocaine. An EMB was performed,  uterus sounded to 7 cm. 3 passes were taken with endometrial pipelle given scant tissue. The single tooth tenaculum was removed and the cervix was found to be hemostatic. The speculum was removed.    Complications:  None; patient tolerated the procedure well.     Disposition: PACU - hemodynamically stable.  Condition: stable

## 2025-07-31 LAB
LABORATORY COMMENT REPORT: NORMAL
PATH REPORT.FINAL DX SPEC: NORMAL
PATH REPORT.GROSS SPEC: NORMAL
PATH REPORT.RELEVANT HX SPEC: NORMAL
PATH REPORT.TOTAL CANCER: NORMAL

## 2025-08-05 ENCOUNTER — APPOINTMENT (OUTPATIENT)
Dept: CARDIOLOGY | Facility: CLINIC | Age: 45
End: 2025-08-05
Payer: COMMERCIAL

## 2025-08-06 ENCOUNTER — APPOINTMENT (OUTPATIENT)
Dept: DERMATOLOGY | Facility: CLINIC | Age: 45
End: 2025-08-06
Payer: COMMERCIAL

## 2025-08-11 DIAGNOSIS — R20.2 PARESTHESIA OF BOTH HANDS: ICD-10-CM

## 2025-08-12 LAB
CYTOLOGY CMNT CVX/VAG CYTO-IMP: NORMAL
LABORATORY COMMENT REPORT: NORMAL
LABORATORY COMMENT REPORT: NORMAL
PATH REPORT.TOTAL CANCER: NORMAL

## 2025-08-12 RX ORDER — DULOXETIN HYDROCHLORIDE 60 MG/1
60 CAPSULE, DELAYED RELEASE ORAL DAILY
Qty: 90 CAPSULE | Refills: 1 | Status: SHIPPED | OUTPATIENT
Start: 2025-08-12

## 2025-08-18 ENCOUNTER — TELEPHONE (OUTPATIENT)
Dept: OBSTETRICS AND GYNECOLOGY | Facility: HOSPITAL | Age: 45
End: 2025-08-18

## 2025-08-18 ENCOUNTER — APPOINTMENT (OUTPATIENT)
Facility: CLINIC | Age: 45
End: 2025-08-18
Payer: COMMERCIAL

## 2025-08-29 DIAGNOSIS — E11.69 TYPE 2 DIABETES MELLITUS WITH OTHER SPECIFIED COMPLICATION, WITHOUT LONG-TERM CURRENT USE OF INSULIN: ICD-10-CM

## 2025-08-29 RX ORDER — BLOOD-GLUCOSE SENSOR
EACH MISCELLANEOUS
Refills: 11 | OUTPATIENT
Start: 2025-08-29

## 2025-09-15 ENCOUNTER — APPOINTMENT (OUTPATIENT)
Facility: CLINIC | Age: 45
End: 2025-09-15
Payer: COMMERCIAL

## 2025-09-16 ENCOUNTER — APPOINTMENT (OUTPATIENT)
Dept: NEPHROLOGY | Facility: CLINIC | Age: 45
End: 2025-09-16
Payer: COMMERCIAL

## 2025-12-12 ENCOUNTER — APPOINTMENT (OUTPATIENT)
Dept: ENDOCRINOLOGY | Facility: CLINIC | Age: 45
End: 2025-12-12
Payer: COMMERCIAL

## 2026-07-14 ENCOUNTER — APPOINTMENT (OUTPATIENT)
Dept: OPHTHALMOLOGY | Facility: CLINIC | Age: 46
End: 2026-07-14
Payer: COMMERCIAL

## (undated) DEVICE — TOWEL, SURGICAL, NEURO, O/R, 16 X 26, BLUE, STERILE

## (undated) DEVICE — COVER, CART, 45 X 27 X 48 IN, CLEAR

## (undated) DEVICE — MANIFOLD, 4 PORT NEPTUNE STANDARD

## (undated) DEVICE — COVER, TABLE, 44 X 75 IN, DISPOSABLE, LF, STERILE

## (undated) DEVICE — Device

## (undated) DEVICE — SPONGE, GAUZE, XRAY DECT, 16 PLY, 4 X 4, W/MASTER DMT,STERILE

## (undated) DEVICE — PREP TRAY, SKIN, DRY, W/GLOVES

## (undated) DEVICE — COVER, LIGHT HANDLE, SURGICAL, FLEXIBLE, DISPOSABLE, STERILE